# Patient Record
Sex: FEMALE | Race: OTHER | HISPANIC OR LATINO | URBAN - METROPOLITAN AREA
[De-identification: names, ages, dates, MRNs, and addresses within clinical notes are randomized per-mention and may not be internally consistent; named-entity substitution may affect disease eponyms.]

---

## 2017-05-08 ENCOUNTER — EMERGENCY (EMERGENCY)
Facility: HOSPITAL | Age: 32
LOS: 0 days | Discharge: HOME | End: 2017-05-08
Attending: EMERGENCY MEDICINE | Admitting: EMERGENCY MEDICINE
Payer: MEDICAID

## 2017-05-08 VITALS
SYSTOLIC BLOOD PRESSURE: 132 MMHG | OXYGEN SATURATION: 100 % | WEIGHT: 179.9 LBS | HEART RATE: 111 BPM | DIASTOLIC BLOOD PRESSURE: 80 MMHG | HEIGHT: 66 IN | TEMPERATURE: 98 F

## 2017-05-08 VITALS
TEMPERATURE: 98 F | OXYGEN SATURATION: 100 % | HEART RATE: 97 BPM | SYSTOLIC BLOOD PRESSURE: 124 MMHG | RESPIRATION RATE: 18 BRPM | DIASTOLIC BLOOD PRESSURE: 75 MMHG

## 2017-05-08 DIAGNOSIS — N39.0 URINARY TRACT INFECTION, SITE NOT SPECIFIED: ICD-10-CM

## 2017-05-08 DIAGNOSIS — N93.9 ABNORMAL UTERINE AND VAGINAL BLEEDING, UNSPECIFIED: ICD-10-CM

## 2017-05-08 DIAGNOSIS — M54.9 DORSALGIA, UNSPECIFIED: ICD-10-CM

## 2017-05-08 LAB
APPEARANCE UR: CLEAR — SIGNIFICANT CHANGE UP
BACTERIA # UR AUTO: (no result)
BILIRUB UR-MCNC: NEGATIVE — SIGNIFICANT CHANGE UP
COLOR SPEC: YELLOW — SIGNIFICANT CHANGE UP
DIFF PNL FLD: (no result)
EPI CELLS # UR: SIGNIFICANT CHANGE UP
GLUCOSE UR QL: NEGATIVE MG/DL — SIGNIFICANT CHANGE UP
KETONES UR-MCNC: NEGATIVE — SIGNIFICANT CHANGE UP
LEUKOCYTE ESTERASE UR-ACNC: (no result)
NITRITE UR-MCNC: NEGATIVE — SIGNIFICANT CHANGE UP
PH UR: 6 — SIGNIFICANT CHANGE UP (ref 5–8)
PROT UR-MCNC: 30 MG/DL
RBC CASTS # UR COMP ASSIST: (no result) /HPF (ref 0–4)
SP GR SPEC: 1.01 — SIGNIFICANT CHANGE UP (ref 1.01–1.02)
UROBILINOGEN FLD QL: NEGATIVE MG/DL — SIGNIFICANT CHANGE UP
WBC UR QL: >50

## 2017-05-08 PROCEDURE — 99283 EMERGENCY DEPT VISIT LOW MDM: CPT

## 2017-05-08 RX ORDER — IBUPROFEN 200 MG
600 TABLET ORAL ONCE
Qty: 0 | Refills: 0 | Status: COMPLETED | OUTPATIENT
Start: 2017-05-08 | End: 2017-05-08

## 2017-05-08 RX ORDER — PHENAZOPYRIDINE HCL 100 MG
1 TABLET ORAL
Qty: 6 | Refills: 0
Start: 2017-05-08 | End: 2017-05-10

## 2017-05-08 RX ORDER — CEPHALEXIN 500 MG
500 CAPSULE ORAL ONCE
Qty: 0 | Refills: 0 | Status: COMPLETED | OUTPATIENT
Start: 2017-05-08 | End: 2017-05-08

## 2017-05-08 RX ORDER — CEPHALEXIN 500 MG
1 CAPSULE ORAL
Qty: 21 | Refills: 0
Start: 2017-05-08 | End: 2017-05-15

## 2017-05-08 RX ORDER — PHENAZOPYRIDINE HCL 100 MG
100 TABLET ORAL ONCE
Qty: 0 | Refills: 0 | Status: COMPLETED | OUTPATIENT
Start: 2017-05-08 | End: 2017-05-08

## 2017-05-08 RX ADMIN — Medication 600 MILLIGRAM(S): at 04:03

## 2017-05-08 RX ADMIN — Medication 500 MILLIGRAM(S): at 04:03

## 2017-05-08 RX ADMIN — Medication 100 MILLIGRAM(S): at 04:03

## 2017-05-08 RX ADMIN — Medication 600 MILLIGRAM(S): at 03:13

## 2017-05-08 NOTE — ED PROVIDER NOTE - MEDICAL DECISION MAKING DETAILS
Patient states pain improved in back after motrin given.  Urine results discussed with patient who wants to trial oral antibiotics as outpatient and follow up with her doctor. Vitals are stable.  Patient understands to return for fever or any worsened symptoms.

## 2017-05-08 NOTE — ED PROVIDER NOTE - DETAILS:
I, Shruthi Roberson, performed the initial face to face bedside interview with this patient regarding history of present illness, review of symptoms and relevant past medical, social and family history.  I completed an independent physical examination.  I was the initial provider who evaluated this patient.  The history, relevant review of systems, past medical and surgical history, medical decision making, and physical examination was documented by the scribe in my presence and I attest to the accuracy of the documentation.

## 2017-05-08 NOTE — ED ADULT NURSE NOTE - OBJECTIVE STATEMENT
Patient arrived to ED c/o low abdominal pain radiating to back with burning. Hematuria since friday, burning sensation. Chills reported today. No significant medical hx.

## 2017-05-08 NOTE — ED ADULT TRIAGE NOTE - CHIEF COMPLAINT QUOTE
Pt. to the ED C/O Lower back pain with lower abdominal pain and light hematuria since friday with chills- Denies medical hx-  LMP 4/21/2017

## 2017-05-08 NOTE — ED PROVIDER NOTE - OBJECTIVE STATEMENT
32 y/o female presents to ED with c/o constant suprapubic/pelvic pain.  Has associated flank pain and worse with movement.  Has frequent urination and dysuria.  Noticed small amounts of blood in urine. Had chills. Denies n/v.

## 2017-05-08 NOTE — ED PROVIDER NOTE - NS ED MD SCRIBE ATTENDING SCRIBE SECTIONS
CONSULTATIONS/SHIFT CHANGE/RESULTS/DISPOSITION/HISTORY OF PRESENT ILLNESS/PAST MEDICAL/SURGICAL/SOCIAL HISTORY/PROGRESS NOTE/INTAKE ASSESSMENT/SCREENINGS/REVIEW OF SYSTEMS/HIV/PROVIDER CARE INITIATION/VITAL SIGNS( Pullset)/MANDATORY DOCUMENTATION/SCALES/OBSERVATION MONITORING PLAN/PHYSICAL EXAM

## 2017-11-08 ENCOUNTER — APPOINTMENT (OUTPATIENT)
Dept: ANTEPARTUM | Facility: CLINIC | Age: 32
End: 2017-11-08
Payer: MEDICAID

## 2017-11-08 ENCOUNTER — ASOB RESULT (OUTPATIENT)
Age: 32
End: 2017-11-08

## 2017-11-08 PROBLEM — Z00.00 ENCOUNTER FOR PREVENTIVE HEALTH EXAMINATION: Status: ACTIVE | Noted: 2017-11-08

## 2017-11-08 PROCEDURE — 76817 TRANSVAGINAL US OBSTETRIC: CPT

## 2017-11-08 PROCEDURE — 76811 OB US DETAILED SNGL FETUS: CPT

## 2017-11-22 ENCOUNTER — ASOB RESULT (OUTPATIENT)
Age: 32
End: 2017-11-22

## 2017-11-22 ENCOUNTER — APPOINTMENT (OUTPATIENT)
Dept: ANTEPARTUM | Facility: CLINIC | Age: 32
End: 2017-11-22
Payer: MEDICAID

## 2017-11-22 PROCEDURE — 76816 OB US FOLLOW-UP PER FETUS: CPT

## 2018-02-28 ENCOUNTER — APPOINTMENT (OUTPATIENT)
Dept: ANTEPARTUM | Facility: CLINIC | Age: 33
End: 2018-02-28
Payer: MEDICAID

## 2018-02-28 ENCOUNTER — ASOB RESULT (OUTPATIENT)
Age: 33
End: 2018-02-28

## 2018-02-28 PROCEDURE — 76816 OB US FOLLOW-UP PER FETUS: CPT

## 2018-02-28 PROCEDURE — 76819 FETAL BIOPHYS PROFIL W/O NST: CPT

## 2018-12-12 ENCOUNTER — EMERGENCY (EMERGENCY)
Facility: HOSPITAL | Age: 33
LOS: 0 days | Discharge: HOME | End: 2018-12-12
Attending: EMERGENCY MEDICINE | Admitting: EMERGENCY MEDICINE
Payer: MEDICAID

## 2018-12-12 VITALS
HEART RATE: 78 BPM | TEMPERATURE: 99 F | SYSTOLIC BLOOD PRESSURE: 141 MMHG | OXYGEN SATURATION: 100 % | RESPIRATION RATE: 16 BRPM | DIASTOLIC BLOOD PRESSURE: 92 MMHG

## 2018-12-12 VITALS — WEIGHT: 188.94 LBS | HEIGHT: 66 IN

## 2018-12-12 DIAGNOSIS — R10.9 UNSPECIFIED ABDOMINAL PAIN: ICD-10-CM

## 2018-12-12 DIAGNOSIS — R11.2 NAUSEA WITH VOMITING, UNSPECIFIED: ICD-10-CM

## 2018-12-12 LAB
ALBUMIN SERPL ELPH-MCNC: 4.2 G/DL — SIGNIFICANT CHANGE UP (ref 3.3–5)
ALP SERPL-CCNC: 96 U/L — SIGNIFICANT CHANGE UP (ref 40–120)
ALT FLD-CCNC: 26 U/L — SIGNIFICANT CHANGE UP (ref 12–78)
ANION GAP SERPL CALC-SCNC: 8 MMOL/L — SIGNIFICANT CHANGE UP (ref 5–17)
APPEARANCE UR: CLEAR — SIGNIFICANT CHANGE UP
APTT BLD: 33.6 SEC — SIGNIFICANT CHANGE UP (ref 27.5–36.3)
AST SERPL-CCNC: 13 U/L — LOW (ref 15–37)
BACTERIA # UR AUTO: ABNORMAL
BASOPHILS # BLD AUTO: 0.06 K/UL — SIGNIFICANT CHANGE UP (ref 0–0.2)
BASOPHILS NFR BLD AUTO: 0.4 % — SIGNIFICANT CHANGE UP (ref 0–2)
BILIRUB SERPL-MCNC: 0.3 MG/DL — SIGNIFICANT CHANGE UP (ref 0.2–1.2)
BILIRUB UR-MCNC: NEGATIVE — SIGNIFICANT CHANGE UP
BUN SERPL-MCNC: 11 MG/DL — SIGNIFICANT CHANGE UP (ref 7–23)
CALCIUM SERPL-MCNC: 8.5 MG/DL — SIGNIFICANT CHANGE UP (ref 8.5–10.1)
CHLORIDE SERPL-SCNC: 103 MMOL/L — SIGNIFICANT CHANGE UP (ref 96–108)
CO2 SERPL-SCNC: 25 MMOL/L — SIGNIFICANT CHANGE UP (ref 22–31)
COLOR SPEC: YELLOW — SIGNIFICANT CHANGE UP
CREAT SERPL-MCNC: 0.77 MG/DL — SIGNIFICANT CHANGE UP (ref 0.5–1.3)
DIFF PNL FLD: ABNORMAL
EOSINOPHIL # BLD AUTO: 0.06 K/UL — SIGNIFICANT CHANGE UP (ref 0–0.5)
EOSINOPHIL NFR BLD AUTO: 0.4 % — SIGNIFICANT CHANGE UP (ref 0–6)
EPI CELLS # UR: SIGNIFICANT CHANGE UP
GLUCOSE SERPL-MCNC: 110 MG/DL — HIGH (ref 70–99)
GLUCOSE UR QL: NEGATIVE MG/DL — SIGNIFICANT CHANGE UP
HCT VFR BLD CALC: 42.7 % — SIGNIFICANT CHANGE UP (ref 34.5–45)
HGB BLD-MCNC: 14.4 G/DL — SIGNIFICANT CHANGE UP (ref 11.5–15.5)
IMM GRANULOCYTES NFR BLD AUTO: 0.4 % — SIGNIFICANT CHANGE UP (ref 0–1.5)
INR BLD: 1.02 RATIO — SIGNIFICANT CHANGE UP (ref 0.88–1.16)
KETONES UR-MCNC: NEGATIVE — SIGNIFICANT CHANGE UP
LEUKOCYTE ESTERASE UR-ACNC: NEGATIVE — SIGNIFICANT CHANGE UP
LIDOCAIN IGE QN: 97 U/L — SIGNIFICANT CHANGE UP (ref 73–393)
LYMPHOCYTES # BLD AUTO: 14.5 % — SIGNIFICANT CHANGE UP (ref 13–44)
LYMPHOCYTES # BLD AUTO: 2.47 K/UL — SIGNIFICANT CHANGE UP (ref 1–3.3)
MCHC RBC-ENTMCNC: 29.8 PG — SIGNIFICANT CHANGE UP (ref 27–34)
MCHC RBC-ENTMCNC: 33.7 GM/DL — SIGNIFICANT CHANGE UP (ref 32–36)
MCV RBC AUTO: 88.4 FL — SIGNIFICANT CHANGE UP (ref 80–100)
MONOCYTES # BLD AUTO: 0.69 K/UL — SIGNIFICANT CHANGE UP (ref 0–0.9)
MONOCYTES NFR BLD AUTO: 4.1 % — SIGNIFICANT CHANGE UP (ref 2–14)
NEUTROPHILS # BLD AUTO: 13.63 K/UL — HIGH (ref 1.8–7.4)
NEUTROPHILS NFR BLD AUTO: 80.2 % — HIGH (ref 43–77)
NITRITE UR-MCNC: NEGATIVE — SIGNIFICANT CHANGE UP
NRBC # BLD: 0 /100 WBCS — SIGNIFICANT CHANGE UP (ref 0–0)
PH UR: 6.5 — SIGNIFICANT CHANGE UP (ref 5–8)
PLATELET # BLD AUTO: 358 K/UL — SIGNIFICANT CHANGE UP (ref 150–400)
POTASSIUM SERPL-MCNC: 4.3 MMOL/L — SIGNIFICANT CHANGE UP (ref 3.5–5.3)
POTASSIUM SERPL-SCNC: 4.3 MMOL/L — SIGNIFICANT CHANGE UP (ref 3.5–5.3)
PROT SERPL-MCNC: 8.6 GM/DL — HIGH (ref 6–8.3)
PROT UR-MCNC: NEGATIVE MG/DL — SIGNIFICANT CHANGE UP
PROTHROM AB SERPL-ACNC: 11.3 SEC — SIGNIFICANT CHANGE UP (ref 10–12.9)
RBC # BLD: 4.83 M/UL — SIGNIFICANT CHANGE UP (ref 3.8–5.2)
RBC # FLD: 12.1 % — SIGNIFICANT CHANGE UP (ref 10.3–14.5)
RBC CASTS # UR COMP ASSIST: ABNORMAL /HPF (ref 0–4)
SODIUM SERPL-SCNC: 136 MMOL/L — SIGNIFICANT CHANGE UP (ref 135–145)
SP GR SPEC: 1.01 — SIGNIFICANT CHANGE UP (ref 1.01–1.02)
UROBILINOGEN FLD QL: NEGATIVE MG/DL — SIGNIFICANT CHANGE UP
WBC # BLD: 16.98 K/UL — HIGH (ref 3.8–10.5)
WBC # FLD AUTO: 16.98 K/UL — HIGH (ref 3.8–10.5)
WBC UR QL: SIGNIFICANT CHANGE UP

## 2018-12-12 PROCEDURE — 99284 EMERGENCY DEPT VISIT MOD MDM: CPT | Mod: 25

## 2018-12-12 RX ORDER — MORPHINE SULFATE 50 MG/1
4 CAPSULE, EXTENDED RELEASE ORAL ONCE
Qty: 0 | Refills: 0 | Status: DISCONTINUED | OUTPATIENT
Start: 2018-12-12 | End: 2018-12-12

## 2018-12-12 RX ORDER — PANTOPRAZOLE SODIUM 20 MG/1
40 TABLET, DELAYED RELEASE ORAL ONCE
Qty: 0 | Refills: 0 | Status: COMPLETED | OUTPATIENT
Start: 2018-12-12 | End: 2018-12-12

## 2018-12-12 RX ORDER — ONDANSETRON 8 MG/1
4 TABLET, FILM COATED ORAL ONCE
Qty: 0 | Refills: 0 | Status: COMPLETED | OUTPATIENT
Start: 2018-12-12 | End: 2018-12-12

## 2018-12-12 RX ORDER — SODIUM CHLORIDE 9 MG/ML
1000 INJECTION INTRAMUSCULAR; INTRAVENOUS; SUBCUTANEOUS ONCE
Qty: 0 | Refills: 0 | Status: COMPLETED | OUTPATIENT
Start: 2018-12-12 | End: 2018-12-12

## 2018-12-12 RX ADMIN — ONDANSETRON 4 MILLIGRAM(S): 8 TABLET, FILM COATED ORAL at 19:28

## 2018-12-12 RX ADMIN — SODIUM CHLORIDE 1000 MILLILITER(S): 9 INJECTION INTRAMUSCULAR; INTRAVENOUS; SUBCUTANEOUS at 20:31

## 2018-12-12 RX ADMIN — MORPHINE SULFATE 4 MILLIGRAM(S): 50 CAPSULE, EXTENDED RELEASE ORAL at 19:28

## 2018-12-12 RX ADMIN — MORPHINE SULFATE 4 MILLIGRAM(S): 50 CAPSULE, EXTENDED RELEASE ORAL at 19:46

## 2018-12-12 RX ADMIN — SODIUM CHLORIDE 1000 MILLILITER(S): 9 INJECTION INTRAMUSCULAR; INTRAVENOUS; SUBCUTANEOUS at 19:26

## 2018-12-12 RX ADMIN — PANTOPRAZOLE SODIUM 40 MILLIGRAM(S): 20 TABLET, DELAYED RELEASE ORAL at 19:28

## 2018-12-12 NOTE — ED STATDOCS - GASTROINTESTINAL, MLM
abdomen soft. +tenderness throughout lower abdomen, no rebound, no guarding. Mild tenderness midepigastric.

## 2018-12-12 NOTE — ED ADULT NURSE NOTE - OBJECTIVE STATEMENT
pt arrives to ED complaining of abdominal pain. pt states the pain started this morning. nontender on palpation. +bowel sounds. pt has associated nausea and vomiting. no significant medical history. pt currently breast feeding eight month old infant. alert and oriented x 4.

## 2018-12-12 NOTE — ED STATDOCS - MEDICAL DECISION MAKING DETAILS
Pt with abd pain, bilateral lower abd, recent viral illness at home, likely same. Plan for labs, fluids, Protonix, Zofran, Morphine, and reassess. If concerns on labs, will perform CT. Advised pt to pump and discard breast milk.

## 2018-12-12 NOTE — ED ADULT NURSE NOTE - NSIMPLEMENTINTERV_GEN_ALL_ED
Implemented All Universal Safety Interventions:  [place] to call system. Call bell, personal items and telephone within reach. Instruct patient to call for assistance. Room bathroom lighting operational. Non-slip footwear when patient is off stretcher. Physically safe environment: no spills, clutter or unnecessary equipment. Stretcher in lowest position, wheels locked, appropriate side rails in place.

## 2018-12-12 NOTE — ED STATDOCS - ATTENDING CONTRIBUTION TO CARE
I, Audi Waters DO,  performed the initial face to face bedside interview with this patient regarding history of present illness, review of symptoms and relevant past medical, social and family history.  I completed an independent physical examination.  I was the initial provider who evaluated this patient. I have signed out the follow up of any pending tests (i.e. labs, radiological studies) to the ACP.  I have communicated the patient’s plan of care and disposition with the ACP.

## 2018-12-12 NOTE — ED STATDOCS - NS_ ATTENDINGSCRIBEDETAILS _ED_A_ED_FT
Audi Waters DO (Attending): The history, relevant review of systems, past medical and surgical history, medical decision making, and physical examination was documented by the scribe in my presence and I attest to the accuracy of the documentation.

## 2018-12-12 NOTE — ED STATDOCS - OBJECTIVE STATEMENT
34 y/o female with no relevant PMHx presents to the ED c/o mid epigastric pain down to the rest of her abd since yesterday. +mild nausea yesterday, none at time of evaluation. +sick contact with daughter who was vomiting last week. No vomiting, diarrhea, fever, chills, dysuria. No recent travel. Pt has 8 month old at her, is breast feeding, LMP in June 2017. Non smoker. No EtOH use. No illicit drug use.

## 2020-02-08 NOTE — ED PROVIDER NOTE - DURATION
Hospital Medicine Discharge Summary    Patient ID: Shan Vaz      Patient's PCP: CHRYSTAL Zafar    Admit Date: 2/6/2020     Discharge Date:   02/08/20    Admitting Physician: Dulce Maria Gallegos MD     Discharge Physician: Dulce Maria Gallegos MD     Discharge Diagnoses: Active Hospital Problems    Diagnosis    GI bleed [K92.2]       The patient was seen and examined on day of discharge and this discharge summary is in conjunction with any daily progress note from day of discharge. Hospital Course:   68 y. o. female who presented to [facility] with weakness. For the past week, patient has been having increased weakness and some mild mental fogginess. Patient is vague about specifics of the these symptoms. Patient is also complaining of alternating constipation and diarrhea. She does report darker than usual stools but no tarry stools. She has never had symptoms like this before.     Acute blood loss anemia due to GI bleed  - occult stool positive  - Hgb 8.3 on admission. Had been normal in 10/19  - reports NSAID use. Advised to discontinue on discharge  - GI consulted  - possible duodenitis seen on CT abd/pel  - RUQ US without evidence of cholecystitis  - s/p EGD with large necrotic duodenal ulcer. Biopsies pending  - transitioned to PO PPI BID  - to follow up with GI as outpatient for repeat EGD     UTI with associated staghorn calculus  - staghorn calculus noted on CT abd/pel  - UA consistent with UTI  - started on ceftriaxone  - sepsis ruled out. Tachycardia and mild Hgb elevation related to GI bleed     DMII  - well controlled  - restarted home regimen     HTN  - restarted home BP meds    HLD  - continue statin    Physical Exam Performed:     /75   Pulse 65   Temp 98.2 °F (36.8 °C) (Oral)   Resp 16   Ht 5' 4\" (1.626 m)   Wt 204 lb (92.5 kg)   SpO2 97%   BMI 35.02 kg/m²       General appearance:  No apparent distress, appears stated age and cooperative.   HEENT:  Normal cephalic, adjacent to the   proximal duodenum and gallbladder. Localized inflammation related to   duodenitis or potentially cholecystitis can not be excluded. No evidence of appendicitis. Popcorn calcification along the left lateral margin of uterus likely reflects   either pedunculated fibroid or partially calcified left adnexal lesion. In   the nonacute setting, pelvic ultrasound could be performed for clarification. Focal sclerosis within L1 vertebral body, typically bone island in an   otherwise healthy patient. If patient has risk factors for metastatic   disease, bone scan could be considered for further assessment. XR CHEST STANDARD (2 VW)   Final Result   No acute cardiopulmonary process. Consults:     IP CONSULT TO HOSPITALIST  IP CONSULT TO GI  IP CONSULT TO HOME CARE NEEDS    Disposition:  Home with home health     Condition at Discharge: Stable    Discharge Instructions/Follow-up:  Follow up with PCP, GI within 1-2 weeks    Code Status:  Full Code     Activity: activity as tolerated    Diet: diabetic diet      Discharge Medications:     Current Discharge Medication List           Details   ferrous sulfate 325 (65 Fe) MG tablet Take 1 tablet by mouth daily (with breakfast)  Qty: 30 tablet, Refills: 3      polyethylene glycol (GLYCOLAX) packet Take 17 g by mouth daily  Qty: 527 g, Refills: 1      cefdinir (OMNICEF) 300 MG capsule Take 1 capsule by mouth 2 times daily for 5 days  Qty: 10 capsule, Refills: 0      pantoprazole (PROTONIX) 40 MG tablet Take 1 tablet by mouth 2 times daily (before meals)  Qty: 60 tablet, Refills: 1              Details   glipiZIDE (GLUCOTROL) 10 MG tablet TAKE 1 TABLET BY MOUTH EVERY DAY  Qty: 90 tablet, Refills: 0      losartan (COZAAR) 100 MG tablet TAKE 1 TABLET BY MOUTH EVERY DAY  Qty: 90 tablet, Refills: 0      amLODIPine (NORVASC) 2.5 MG tablet Take 1 tablet by mouth daily  Qty: 90 tablet, Refills: 0      !!  Handicap Placard MISC Expiration 1/2025  Qty: 1 each, Refills: 0      oxybutynin (DITROPAN) 5 MG tablet Take 1 tablet by mouth 2 times daily  Qty: 60 tablet, Refills: 3      !! Handicap Placard MISC by Does not apply route  Qty: 1 each, Refills: 0    Associated Diagnoses: Chronic pain of both knees; Type 2 diabetes mellitus without complication, without long-term current use of insulin (HCC)      metoprolol (LOPRESSOR) 100 MG tablet Take 100 mg by mouth       atorvastatin (LIPITOR) 40 MG tablet Take 1 tablet by mouth daily  Qty: 90 tablet, Refills: 1      Cholecalciferol 100 MCG (4000 UT) CAPS Take 1 capsule by mouth daily  Qty: 90 capsule, Refills: 1       !! - Potential duplicate medications found. Please discuss with provider. Time Spent on discharge is more than 30 minutes in the examination, evaluation, counseling and review of medications and discharge plan. Signed:    Chasity Gallardo MD   2/8/2020      Thank you CHRYSTAL Plummer for the opportunity to be involved in this patient's care. If you have any questions or concerns please feel free to contact me at [phone]. day(s)

## 2020-03-23 ENCOUNTER — INPATIENT (INPATIENT)
Facility: HOSPITAL | Age: 35
LOS: 2 days | Discharge: HOME | DRG: 253 | End: 2020-03-26
Attending: HOSPITALIST | Admitting: HOSPITALIST
Payer: MEDICAID

## 2020-03-23 VITALS — HEIGHT: 65 IN | WEIGHT: 149.91 LBS

## 2020-03-23 LAB
ALBUMIN SERPL ELPH-MCNC: 3.6 G/DL — SIGNIFICANT CHANGE UP (ref 3.3–5)
ALP SERPL-CCNC: 80 U/L — SIGNIFICANT CHANGE UP (ref 40–120)
ALT FLD-CCNC: 20 U/L — SIGNIFICANT CHANGE UP (ref 12–78)
ANION GAP SERPL CALC-SCNC: 5 MMOL/L — SIGNIFICANT CHANGE UP (ref 5–17)
APPEARANCE UR: CLEAR — SIGNIFICANT CHANGE UP
AST SERPL-CCNC: 15 U/L — SIGNIFICANT CHANGE UP (ref 15–37)
BASOPHILS # BLD AUTO: 0.04 K/UL — SIGNIFICANT CHANGE UP (ref 0–0.2)
BASOPHILS NFR BLD AUTO: 0.4 % — SIGNIFICANT CHANGE UP (ref 0–2)
BILIRUB SERPL-MCNC: 0.3 MG/DL — SIGNIFICANT CHANGE UP (ref 0.2–1.2)
BILIRUB UR-MCNC: NEGATIVE — SIGNIFICANT CHANGE UP
BUN SERPL-MCNC: 22 MG/DL — SIGNIFICANT CHANGE UP (ref 7–23)
CALCIUM SERPL-MCNC: 8.3 MG/DL — LOW (ref 8.5–10.1)
CHLORIDE SERPL-SCNC: 105 MMOL/L — SIGNIFICANT CHANGE UP (ref 96–108)
CO2 SERPL-SCNC: 28 MMOL/L — SIGNIFICANT CHANGE UP (ref 22–31)
COLOR SPEC: YELLOW — SIGNIFICANT CHANGE UP
CREAT SERPL-MCNC: 0.84 MG/DL — SIGNIFICANT CHANGE UP (ref 0.5–1.3)
DIFF PNL FLD: ABNORMAL
EOSINOPHIL # BLD AUTO: 0.14 K/UL — SIGNIFICANT CHANGE UP (ref 0–0.5)
EOSINOPHIL NFR BLD AUTO: 1.3 % — SIGNIFICANT CHANGE UP (ref 0–6)
GLUCOSE SERPL-MCNC: 103 MG/DL — HIGH (ref 70–99)
GLUCOSE UR QL: NEGATIVE MG/DL — SIGNIFICANT CHANGE UP
HCT VFR BLD CALC: 35.2 % — SIGNIFICANT CHANGE UP (ref 34.5–45)
HGB BLD-MCNC: 12 G/DL — SIGNIFICANT CHANGE UP (ref 11.5–15.5)
IMM GRANULOCYTES NFR BLD AUTO: 0.3 % — SIGNIFICANT CHANGE UP (ref 0–1.5)
KETONES UR-MCNC: NEGATIVE — SIGNIFICANT CHANGE UP
LEUKOCYTE ESTERASE UR-ACNC: NEGATIVE — SIGNIFICANT CHANGE UP
LIDOCAIN IGE QN: 108 U/L — SIGNIFICANT CHANGE UP (ref 73–393)
LYMPHOCYTES # BLD AUTO: 3.91 K/UL — HIGH (ref 1–3.3)
LYMPHOCYTES # BLD AUTO: 37.5 % — SIGNIFICANT CHANGE UP (ref 13–44)
MCHC RBC-ENTMCNC: 29.6 PG — SIGNIFICANT CHANGE UP (ref 27–34)
MCHC RBC-ENTMCNC: 34.1 GM/DL — SIGNIFICANT CHANGE UP (ref 32–36)
MCV RBC AUTO: 86.9 FL — SIGNIFICANT CHANGE UP (ref 80–100)
MONOCYTES # BLD AUTO: 0.52 K/UL — SIGNIFICANT CHANGE UP (ref 0–0.9)
MONOCYTES NFR BLD AUTO: 5 % — SIGNIFICANT CHANGE UP (ref 2–14)
NEUTROPHILS # BLD AUTO: 5.78 K/UL — SIGNIFICANT CHANGE UP (ref 1.8–7.4)
NEUTROPHILS NFR BLD AUTO: 55.5 % — SIGNIFICANT CHANGE UP (ref 43–77)
NITRITE UR-MCNC: NEGATIVE — SIGNIFICANT CHANGE UP
PH UR: 5 — SIGNIFICANT CHANGE UP (ref 5–8)
PLATELET # BLD AUTO: 382 K/UL — SIGNIFICANT CHANGE UP (ref 150–400)
POTASSIUM SERPL-MCNC: 4 MMOL/L — SIGNIFICANT CHANGE UP (ref 3.5–5.3)
POTASSIUM SERPL-SCNC: 4 MMOL/L — SIGNIFICANT CHANGE UP (ref 3.5–5.3)
PROT SERPL-MCNC: 7.6 GM/DL — SIGNIFICANT CHANGE UP (ref 6–8.3)
PROT UR-MCNC: NEGATIVE MG/DL — SIGNIFICANT CHANGE UP
RBC # BLD: 4.05 M/UL — SIGNIFICANT CHANGE UP (ref 3.8–5.2)
RBC # FLD: 12.3 % — SIGNIFICANT CHANGE UP (ref 10.3–14.5)
SODIUM SERPL-SCNC: 138 MMOL/L — SIGNIFICANT CHANGE UP (ref 135–145)
SP GR SPEC: 1.02 — SIGNIFICANT CHANGE UP (ref 1.01–1.02)
UROBILINOGEN FLD QL: NEGATIVE MG/DL — SIGNIFICANT CHANGE UP
WBC # BLD: 10.42 K/UL — SIGNIFICANT CHANGE UP (ref 3.8–10.5)
WBC # FLD AUTO: 10.42 K/UL — SIGNIFICANT CHANGE UP (ref 3.8–10.5)

## 2020-03-23 PROCEDURE — 93010 ELECTROCARDIOGRAM REPORT: CPT

## 2020-03-23 PROCEDURE — 74174 CTA ABD&PLVS W/CONTRAST: CPT | Mod: 26

## 2020-03-23 RX ORDER — SODIUM CHLORIDE 9 MG/ML
1000 INJECTION INTRAMUSCULAR; INTRAVENOUS; SUBCUTANEOUS ONCE
Refills: 0 | Status: COMPLETED | OUTPATIENT
Start: 2020-03-23 | End: 2020-03-23

## 2020-03-23 RX ADMIN — SODIUM CHLORIDE 2000 MILLILITER(S): 9 INJECTION INTRAMUSCULAR; INTRAVENOUS; SUBCUTANEOUS at 21:22

## 2020-03-23 NOTE — ED ADULT NURSE NOTE - OBJECTIVE STATEMENT
Patient presents with four episodes of bright red blood starting today. Patient denies lightheaded dizziness or shortness of breath

## 2020-03-23 NOTE — ED ADULT NURSE REASSESSMENT NOTE - NS ED NURSE REASSESS COMMENT FT1
Received report from [facility].  1st unit of PRBCs infusing. Vital signs stable.  Awaiting CT results. Will continue to monitor.

## 2020-03-23 NOTE — ED STATDOCS - ATTENDING CONTRIBUTION TO CARE
I, Antonio Costa, performed the initial face to face bedside interview with this patient regarding history of present illness, review of symptoms and relevant past medical, social and family history.  I completed an independent physical examination.  I was the initial provider who evaluated this patient. I have signed out the follow up of any pending tests (i.e. labs, radiological studies) to the ACP.  I have communicated the patient’s plan of care and disposition with the ACP.  The history, relevant review of systems, past medical and surgical history, medical decision making, and physical examination was documented by the scribe in my presence and I attest to the accuracy of the documentation.

## 2020-03-23 NOTE — ED STATDOCS - OBJECTIVE STATEMENT
33 y/o female with no PMHx presents c/o 4 BM that were blood streaked with bright red blood per rectum since 4pm today. Pt also with diffuse cramping abd pain. Pt reports straining x few days with BMs last week. denies any external hemorrhoids or lumps. no medications, ASA, NSAIDs. denies cough, fever, travel,. sick contacts. PI# [id number].

## 2020-03-23 NOTE — ED STATDOCS - PROGRESS NOTE DETAILS
35 y/o female with no PMHx presents c/o 4 BM that were blood streaked with bright red blood per rectum since 4pm today. Pt also with diffuse cramping abd pain. Pt reports straining x few days with BMs last week. denies any external hemorrhoids or lumps. no medications, ASA, NSAIDs. denies cough, fever, travel,. sick contacts. PI# [id number]. Abd: soft (+) diffuse tenderness to abdomen plan: ct, labs, and reeval. -Rebeca Hines PA-C pt transferred to [facility] for higher acuity of care, she went to bathroom had BRBPR and near syncopal episode. MD at bedside.  -Rebeca Hines PA-C

## 2020-03-23 NOTE — ED ADULT TRIAGE NOTE - CHIEF COMPLAINT QUOTE
pt p/w c/o BRBPR x 4 today.  pt states she is asymptomatic at this time denies HA, dizziness, sob, palpitations, fever, cough.  no sick contacts or recent travel.

## 2020-03-24 DIAGNOSIS — K92.2 GASTROINTESTINAL HEMORRHAGE, UNSPECIFIED: ICD-10-CM

## 2020-03-24 LAB
BASOPHILS # BLD AUTO: 0.02 K/UL — SIGNIFICANT CHANGE UP (ref 0–0.2)
BASOPHILS NFR BLD AUTO: 0.2 % — SIGNIFICANT CHANGE UP (ref 0–2)
CULTURE RESULTS: SIGNIFICANT CHANGE UP
EOSINOPHIL # BLD AUTO: 0.01 K/UL — SIGNIFICANT CHANGE UP (ref 0–0.5)
EOSINOPHIL NFR BLD AUTO: 0.1 % — SIGNIFICANT CHANGE UP (ref 0–6)
HCT VFR BLD CALC: 30.3 % — LOW (ref 34.5–45)
HCT VFR BLD CALC: 33.3 % — LOW (ref 34.5–45)
HCT VFR BLD CALC: 33.4 % — LOW (ref 34.5–45)
HGB BLD-MCNC: 10.2 G/DL — LOW (ref 11.5–15.5)
HGB BLD-MCNC: 11.3 G/DL — LOW (ref 11.5–15.5)
HGB BLD-MCNC: 11.5 G/DL — SIGNIFICANT CHANGE UP (ref 11.5–15.5)
IMM GRANULOCYTES NFR BLD AUTO: 0.4 % — SIGNIFICANT CHANGE UP (ref 0–1.5)
LYMPHOCYTES # BLD AUTO: 1.9 K/UL — SIGNIFICANT CHANGE UP (ref 1–3.3)
LYMPHOCYTES # BLD AUTO: 14.9 % — SIGNIFICANT CHANGE UP (ref 13–44)
MCHC RBC-ENTMCNC: 28.8 PG — SIGNIFICANT CHANGE UP (ref 27–34)
MCHC RBC-ENTMCNC: 29.3 PG — SIGNIFICANT CHANGE UP (ref 27–34)
MCHC RBC-ENTMCNC: 33.7 GM/DL — SIGNIFICANT CHANGE UP (ref 32–36)
MCHC RBC-ENTMCNC: 33.8 GM/DL — SIGNIFICANT CHANGE UP (ref 32–36)
MCV RBC AUTO: 85.6 FL — SIGNIFICANT CHANGE UP (ref 80–100)
MCV RBC AUTO: 86.5 FL — SIGNIFICANT CHANGE UP (ref 80–100)
MONOCYTES # BLD AUTO: 0.43 K/UL — SIGNIFICANT CHANGE UP (ref 0–0.9)
MONOCYTES NFR BLD AUTO: 3.4 % — SIGNIFICANT CHANGE UP (ref 2–14)
NEUTROPHILS # BLD AUTO: 10.31 K/UL — HIGH (ref 1.8–7.4)
NEUTROPHILS NFR BLD AUTO: 81 % — HIGH (ref 43–77)
PLATELET # BLD AUTO: 253 K/UL — SIGNIFICANT CHANGE UP (ref 150–400)
PLATELET # BLD AUTO: 261 K/UL — SIGNIFICANT CHANGE UP (ref 150–400)
RBC # BLD: 3.54 M/UL — LOW (ref 3.8–5.2)
RBC # BLD: 3.86 M/UL — SIGNIFICANT CHANGE UP (ref 3.8–5.2)
RBC # FLD: 13.2 % — SIGNIFICANT CHANGE UP (ref 10.3–14.5)
RBC # FLD: 13.2 % — SIGNIFICANT CHANGE UP (ref 10.3–14.5)
SPECIMEN SOURCE: SIGNIFICANT CHANGE UP
WBC # BLD: 12.72 K/UL — HIGH (ref 3.8–10.5)
WBC # BLD: 8.99 K/UL — SIGNIFICANT CHANGE UP (ref 3.8–10.5)
WBC # FLD AUTO: 12.72 K/UL — HIGH (ref 3.8–10.5)
WBC # FLD AUTO: 8.99 K/UL — SIGNIFICANT CHANGE UP (ref 3.8–10.5)

## 2020-03-24 PROCEDURE — 36415 COLL VENOUS BLD VENIPUNCTURE: CPT

## 2020-03-24 PROCEDURE — 80048 BASIC METABOLIC PNL TOTAL CA: CPT

## 2020-03-24 PROCEDURE — 90686 IIV4 VACC NO PRSV 0.5 ML IM: CPT

## 2020-03-24 PROCEDURE — 85014 HEMATOCRIT: CPT

## 2020-03-24 PROCEDURE — 85027 COMPLETE CBC AUTOMATED: CPT

## 2020-03-24 PROCEDURE — 99223 1ST HOSP IP/OBS HIGH 75: CPT

## 2020-03-24 PROCEDURE — 85018 HEMOGLOBIN: CPT

## 2020-03-24 PROCEDURE — 85025 COMPLETE CBC W/AUTO DIFF WBC: CPT

## 2020-03-24 PROCEDURE — C9113: CPT

## 2020-03-24 PROCEDURE — 12345: CPT | Mod: NC

## 2020-03-24 PROCEDURE — G0008: CPT

## 2020-03-24 RX ORDER — PANTOPRAZOLE SODIUM 20 MG/1
40 TABLET, DELAYED RELEASE ORAL EVERY 12 HOURS
Refills: 0 | Status: DISCONTINUED | OUTPATIENT
Start: 2020-03-24 | End: 2020-03-24

## 2020-03-24 RX ORDER — ONDANSETRON 8 MG/1
4 TABLET, FILM COATED ORAL EVERY 6 HOURS
Refills: 0 | Status: DISCONTINUED | OUTPATIENT
Start: 2020-03-24 | End: 2020-03-26

## 2020-03-24 RX ORDER — SODIUM CHLORIDE 9 MG/ML
1000 INJECTION INTRAMUSCULAR; INTRAVENOUS; SUBCUTANEOUS
Refills: 0 | Status: DISCONTINUED | OUTPATIENT
Start: 2020-03-24 | End: 2020-03-26

## 2020-03-24 RX ORDER — INFLUENZA VIRUS VACCINE 15; 15; 15; 15 UG/.5ML; UG/.5ML; UG/.5ML; UG/.5ML
0.5 SUSPENSION INTRAMUSCULAR ONCE
Refills: 0 | Status: COMPLETED | OUTPATIENT
Start: 2020-03-24 | End: 2020-03-26

## 2020-03-24 RX ORDER — PANTOPRAZOLE SODIUM 20 MG/1
40 TABLET, DELAYED RELEASE ORAL
Refills: 0 | Status: DISCONTINUED | OUTPATIENT
Start: 2020-03-24 | End: 2020-03-26

## 2020-03-24 RX ORDER — MORPHINE SULFATE 50 MG/1
2 CAPSULE, EXTENDED RELEASE ORAL ONCE
Refills: 0 | Status: DISCONTINUED | OUTPATIENT
Start: 2020-03-24 | End: 2020-03-24

## 2020-03-24 RX ADMIN — MORPHINE SULFATE 2 MILLIGRAM(S): 50 CAPSULE, EXTENDED RELEASE ORAL at 04:38

## 2020-03-24 RX ADMIN — PANTOPRAZOLE SODIUM 40 MILLIGRAM(S): 20 TABLET, DELAYED RELEASE ORAL at 06:11

## 2020-03-24 RX ADMIN — MORPHINE SULFATE 2 MILLIGRAM(S): 50 CAPSULE, EXTENDED RELEASE ORAL at 04:23

## 2020-03-24 RX ADMIN — SODIUM CHLORIDE 125 MILLILITER(S): 9 INJECTION INTRAMUSCULAR; INTRAVENOUS; SUBCUTANEOUS at 04:23

## 2020-03-24 RX ADMIN — SODIUM CHLORIDE 125 MILLILITER(S): 9 INJECTION INTRAMUSCULAR; INTRAVENOUS; SUBCUTANEOUS at 13:53

## 2020-03-24 NOTE — H&P ADULT - HISTORY OF PRESENT ILLNESS
34 year old female patient presented to the ED after 4 episodes of bright red blood per rectum during bowel movements that were associated with mild abdominal cramping. Patient Denies any nausea, vomiting, dizziness, presyncope, recent NSAID or alcohol use.       In the ED patient was found to have a H&H of 12/35. Patient had a brief syncopal episode while attempting to have another bowel movement, which was noted for blood. A CTA abdomen/pelvis was negative for an acute hemorrhage. Patient transfused 2 units PRBC

## 2020-03-24 NOTE — ED PROVIDER NOTE - NEUROLOGICAL, MLM
Alert and oriented, no focal deficits, no motor or sensory deficits. CNs 2-12 grossly intact. NIH=0 GCS=15

## 2020-03-24 NOTE — ED PROVIDER NOTE - CONSTITUTIONAL, MLM
normal... Well appearing, awake, alert, oriented to person, place, time/situation and in no apparent distress. Nontoxic appearing.

## 2020-03-24 NOTE — H&P ADULT - ASSESSMENT
34 year old female patient with Gastrointestinal bleed and brief vagovagal syncopal episode      -Admit to [facility]      #GI bleed  -currently NPO  -serial abdominal examination  -get 2 large bore IVs  -protonix IV  -type and cross on file  -patient to be transfused 2 units PRBC  -Gastroenterology to evaluate pt      # Vasovagal syncope  -pt with brief syncopal episode after straining/ while having bowel movement  -[place] syncope rule currently pt patient in a low risk group for serious outcome  -no indication for telemetry monitoring or cardiology evaluation    #DVT ppx  -encourage ambulation

## 2020-03-24 NOTE — PATIENT PROFILE ADULT - HAS THE PATIENT EXPERIENCED ANY OF THE FOLLOWING WITHIN THE WEEK PRIOR TO ADMISSION?
Called to discuss ultrasound result a cervical length was completely normal. She can call back to discuss or follow-up routine. Left message   no

## 2020-03-24 NOTE — H&P ADULT - NSHPPHYSICALEXAM_GEN_ALL_CORE
Vital Signs Last 24 Hrs  T(C): 36.9 (24 Mar 2020 03:42), Max: 37.2 (24 Mar 2020 00:02)  T(F): 98.5 (24 Mar 2020 03:42), Max: 99 (24 Mar 2020 00:02)  HR: 88 (24 Mar 2020 03:42) (88 - 100)  BP: 110/62 (24 Mar 2020 03:42) (97/82 - 161/81)  BP(mean): 89 (23 Mar 2020 22:12) (89 - 98)  RR: 16 (24 Mar 2020 03:42) (16 - 19)  SpO2: 100% (24 Mar 2020 03:42) (100% - 100%)

## 2020-03-24 NOTE — ED PROVIDER NOTE - OBJECTIVE STATEMENT
34 [facility], near syncope. No cp, sob or palpitation. No fever or chills. No melena but has had blood in stool.

## 2020-03-24 NOTE — CONSULT NOTE ADULT - SUBJECTIVE AND OBJECTIVE BOX
GI consult    HPI:  34 year old female patient presented to the ED after 4 episodes of bright red blood per rectum during bowel movements that were associated with mild abdominal cramping. Patient Denies any nausea, vomiting, dizziness, presyncope, recent NSAID or alcohol use.   In the ED patient was found to have a H&H of 12/35. Patient had a brief syncopal episode while attempting to have another bowel movement, which was noted for blood. A CTA abdomen/pelvis was negative for an acute hemorrhage. Patient transfused 2 units PRBC.  Today is well with mild lingering lower abd pain, no futher bleeding.      PAST MEDICAL & SURGICAL HISTORY:  No significant past medical history  No significant past surgical history      Home Medications:  folic acid:  orally  (08 May 2017 03:12)  multivitamin:    (08 May 2017 03:12)      MEDICATIONS  (STANDING):  influenza   Vaccine 0.5 milliLiter(s) IntraMuscular once  pantoprazole  Injectable 40 milliGRAM(s) IV Push every 12 hours  sodium chloride 0.9%. 1000 milliLiter(s) (125 mL/Hr) IV Continuous <Continuous>    MEDICATIONS  (PRN):  ondansetron Injectable 4 milliGRAM(s) IV Push every 6 hours PRN Nausea      Allergies    No Known Allergies    Intolerances        SOCIAL HISTORY: NC    FAMILY HISTORY:  FH: HTN (hypertension)  Family history of diabetes mellitus (DM)      ROS  As above  Otherwise unremarkable, all systems reviewed    PE:  Vital Signs Last 24 Hrs  T(C): 36.9 (24 Mar 2020 10:47), Max: 37.2 (24 Mar 2020 00:02)  T(F): 98.5 (24 Mar 2020 10:47), Max: 99 (24 Mar 2020 00:02)  HR: 84 (24 Mar 2020 10:47) (84 - 100)  BP: 104/63 (24 Mar 2020 10:47) (97/82 - 161/81)  BP(mean): 89 (23 Mar 2020 22:12) (89 - 98)  RR: 16 (24 Mar 2020 10:47) (16 - 19)  SpO2: 100% (24 Mar 2020 10:47) (100% - 100%)    Constitutional: NAD, well-developed, A+Ox3  Anicteric   Respiratory: CTABL, breathing comfortably  Cardiovascular: S1 and S2, RRR  Gastrointestinal: +BS, soft, mild LLQ and RLQ tenderness, non distended, no mass  Extremities: warm, well perfused, no edema  Psychiatric: Normal mood, normal affect  Neuro: moves all extremities, grossly intact  Skin: No rashes or lesions    LABS:                        11.3   12.72 )-----------( 261      ( 24 Mar 2020 06:27 )             33.4     03-23    138  |  105  |  22  ----------------------------<  103<H>  4.0   |  28  |  0.84    Ca    8.3<L>      23 Mar 2020 20:46    TPro  7.6  /  Alb  3.6  /  TBili  0.3  /  DBili  x   /  AST  15  /  ALT  20  /  AlkPhos  80  03-23      LIVER FUNCTIONS - ( 23 Mar 2020 20:46 )  Alb: 3.6 g/dL / Pro: 7.6 gm/dL / ALK PHOS: 80 U/L / ALT: 20 U/L / AST: 15 U/L / GGT: x             RADIOLOGY & ADDITIONAL STUDIES:  CTA abd/pel negative

## 2020-03-24 NOTE — ED PROVIDER NOTE - EYES, MLM
Clear bilaterally, pupils equal, round and reactive to light. EOMI. Visual fields intact x 4 quadrants.

## 2020-03-24 NOTE — ED PROVIDER NOTE - CLINICAL SUMMARY MEDICAL DECISION MAKING FREE TEXT BOX
blood in stool, near syncope, high risk for active GI bleeding, will admit pending GI consultation and further evaluation in patient including trending of H/H and to transfuse as needed.

## 2020-03-24 NOTE — CONSULT NOTE ADULT - ASSESSMENT
34F with acute GI bleeding, likely LGIB given tenderness. Consider colitis/ischemic colitis, diverticular bleeding although young. Also consider PUD with rapid transit given syncope, although no melena and this should not cause lower tenderness. Pt is now stable, hgb stable (although was given blood) and no further bleeding in hospital.    Rec:  -f/u CBC just done  -PPI once daily, continue after discharge for 30 days (empiric)  -resume regular diet  -ideally would have endoscopic eval but no cases are being done due to COVID-19 pandemic unless absolutely necessary and pt currently stable  -d/c home if yasemin PO and hgb acceptable  -d/w pt, she should follow up in a few months when routine testing available.

## 2020-03-25 LAB
ANION GAP SERPL CALC-SCNC: 4 MMOL/L — LOW (ref 5–17)
BUN SERPL-MCNC: 12 MG/DL — SIGNIFICANT CHANGE UP (ref 7–23)
CALCIUM SERPL-MCNC: 7.9 MG/DL — LOW (ref 8.5–10.1)
CHLORIDE SERPL-SCNC: 111 MMOL/L — HIGH (ref 96–108)
CO2 SERPL-SCNC: 26 MMOL/L — SIGNIFICANT CHANGE UP (ref 22–31)
CREAT SERPL-MCNC: 0.55 MG/DL — SIGNIFICANT CHANGE UP (ref 0.5–1.3)
GLUCOSE SERPL-MCNC: 83 MG/DL — SIGNIFICANT CHANGE UP (ref 70–99)
HCT VFR BLD CALC: 28.6 % — LOW (ref 34.5–45)
HCT VFR BLD CALC: 29.3 % — LOW (ref 34.5–45)
HGB BLD-MCNC: 9.6 G/DL — LOW (ref 11.5–15.5)
HGB BLD-MCNC: 9.9 G/DL — LOW (ref 11.5–15.5)
MCHC RBC-ENTMCNC: 29.3 PG — SIGNIFICANT CHANGE UP (ref 27–34)
MCHC RBC-ENTMCNC: 29.4 PG — SIGNIFICANT CHANGE UP (ref 27–34)
MCHC RBC-ENTMCNC: 33.6 GM/DL — SIGNIFICANT CHANGE UP (ref 32–36)
MCHC RBC-ENTMCNC: 33.8 GM/DL — SIGNIFICANT CHANGE UP (ref 32–36)
MCV RBC AUTO: 86.7 FL — SIGNIFICANT CHANGE UP (ref 80–100)
MCV RBC AUTO: 87.7 FL — SIGNIFICANT CHANGE UP (ref 80–100)
PLATELET # BLD AUTO: 230 K/UL — SIGNIFICANT CHANGE UP (ref 150–400)
PLATELET # BLD AUTO: 250 K/UL — SIGNIFICANT CHANGE UP (ref 150–400)
POTASSIUM SERPL-MCNC: 3.9 MMOL/L — SIGNIFICANT CHANGE UP (ref 3.5–5.3)
POTASSIUM SERPL-SCNC: 3.9 MMOL/L — SIGNIFICANT CHANGE UP (ref 3.5–5.3)
RBC # BLD: 3.26 M/UL — LOW (ref 3.8–5.2)
RBC # BLD: 3.38 M/UL — LOW (ref 3.8–5.2)
RBC # FLD: 13.1 % — SIGNIFICANT CHANGE UP (ref 10.3–14.5)
RBC # FLD: 13.2 % — SIGNIFICANT CHANGE UP (ref 10.3–14.5)
SODIUM SERPL-SCNC: 141 MMOL/L — SIGNIFICANT CHANGE UP (ref 135–145)
WBC # BLD: 6.29 K/UL — SIGNIFICANT CHANGE UP (ref 3.8–10.5)
WBC # BLD: 7.98 K/UL — SIGNIFICANT CHANGE UP (ref 3.8–10.5)
WBC # FLD AUTO: 6.29 K/UL — SIGNIFICANT CHANGE UP (ref 3.8–10.5)
WBC # FLD AUTO: 7.98 K/UL — SIGNIFICANT CHANGE UP (ref 3.8–10.5)

## 2020-03-25 PROCEDURE — 99232 SBSQ HOSP IP/OBS MODERATE 35: CPT

## 2020-03-25 RX ADMIN — PANTOPRAZOLE SODIUM 40 MILLIGRAM(S): 20 TABLET, DELAYED RELEASE ORAL at 05:10

## 2020-03-25 RX ADMIN — SODIUM CHLORIDE 125 MILLILITER(S): 9 INJECTION INTRAMUSCULAR; INTRAVENOUS; SUBCUTANEOUS at 06:28

## 2020-03-25 NOTE — PROGRESS NOTE ADULT - SUBJECTIVE AND OBJECTIVE BOX
GI    had blood in stool today, darker red, then another small darker BM  no active fresh blood  hgb down  no add'l blood given  yasemin PO  pain more localized to L abd and less than yest    PE: NAD  abd mild LLQ tenderness                          9.9    7.98  )-----------( 250      ( 25 Mar 2020 14:58 )             29.3     a/p  LGIB, resolving  likely passing old blood  unclear source-diverticular/ischemic colitis/hemorrhoids/neoplasm. Seems less likely UGIB.    Plan:  -no inpatient endoscopy/colonoscopy given COVID situation  -expect hgb to stabilize and can d/c home once it does  -she was given my information and [facility] information and was instructed to arrange colonoscopy once routine testing available again in a few months  -if rebleeds she will go back to ER  -No GI objection to d/c  -pt requests a week off work, will defer this to discharging doctor

## 2020-03-25 NOTE — PROGRESS NOTE ADULT - SUBJECTIVE AND OBJECTIVE BOX
CC-  GI bleed (24 Mar 2020 18:16)    HPI:  34 year old female patient presented to the ED after 4 episodes of bright red blood per rectum during bowel movements that were associated with mild abdominal cramping. Patient Denies any nausea, vomiting, dizziness, presyncope, recent NSAID or alcohol use.   In the ED patient was found to have a H&H of . Patient had a brief syncopal episode while attempting to have another bowel movement, which was noted for blood. A CTA abdomen/pelvis was negative for an acute hemorrhage. Patient transfused 2 units PRBC (24 Mar 2020 03:52)    3/25/20- still with rectal bleed, +LLQ abd pain    Review of system- All 10 systems reviewed and is as per HPI otherwise negative.     T(C): 36.9 (20 @ 10:51), Max: 37.2 (20 @ 17:28)  HR: 84 (20 @ 10:51) (68 - 84)  BP: 108/62 (20 @ 10:51) (108/56 - 110/50)  RR: 16 (20 @ 10:51) (16 - 16)  SpO2: 99% (20 @ 10:51) (99% - 100%)  Wt(kg): --    LABS:                        9.9    7.98  )-----------( 250      ( 25 Mar 2020 14:58 )             29.3     141  |  111<H>  |  12  ----------------------------<  83  3.9   |  26  |  0.55    Ca    7.9<L>      25 Mar 2020 06:33    TPro  7.6  /  Alb  3.6  /  TBili  0.3  /  DBili  x   /  AST  15  /  ALT  20  /  AlkPhos  80  03-23    Urinalysis Basic - ( 23 Mar 2020 20:46 )  Color: Yellow / Appearance: Clear / S.020 / pH: x  Gluc: x / Ketone: Negative  / Bili: Negative / Urobili: Negative mg/dL   Blood: x / Protein: Negative mg/dL / Nitrite: Negative   Leuk Esterase: Negative / RBC: 3-5 /HPF / WBC Negative   Sq Epi: x / Non Sq Epi: Negative / Bacteria: Occasional    POCT Blood Glucose.: 100 mg/dL (23 Mar 2020 21:05)    RADIOLOGY & ADDITIONAL TESTS:  EXAM:  CT ANGIO ABD PELVIS (W)AW IC                        PROCEDURE DATE:  2020    INTERPRETATION:  CLINICAL INFORMATION: GI bleeding. Syncope.    COMPARISON: None.    PROCEDURE:   CT of the Abdomen and Pelvis was performed with and without intravenous contrast.  Precontrast, Arterial and Delayed phases were performed.  Intravenous contrast: 90 ml Omnipaque 350. 10 ml discarded.  Oral contrast: None.  Sagittal and coronal reformats were performed. MIP reformats were obtained.    FINDINGS:    LOWER CHEST: Within normal limits.    LIVER: Within normal limits.  BILE DUCTS: Normal caliber.  GALLBLADDER: Within normal limits.  SPLEEN: Within normal limits.  PANCREAS: Within normal limits.  ADRENALS: Within normal limits.  KIDNEYS/URETERS: Within normal limits.    BLADDER: Within normal limits.  REPRODUCTIVE ORGANS: 3.9 cm right adnexal cyst. Uterus and left adnexa are unremarkable.    BOWEL: No bowel obstruction. Appendix is normal.  PERITONEUM: No ascites.  VESSELS: Within normal limits.  RETROPERITONEUM/LYMPH NODES: No lymphadenopathy.    ABDOMINAL WALL: Small fat-containing umbilical hernia.  BONES: Within normal limits.    IMPRESSION:     No evidence of acute gastrointestinal hemorrhage on CTA.  3.9 cm right adnexal cyst. Evaluation with pelvic sonogram may be obtained as clinically warranted.    PHYSICAL EXAM:  GENERAL: NAD, well-groomed, well-developed  HEAD:  Atraumatic, Normocephalic  EYES: EOMI, PERRLA, conjunctiva and sclera clear  HEENT: Moist mucous membranes  NECK: Supple, No JVD  NERVOUS SYSTEM:  Alert & Oriented X3, Motor Strength 5/5 B/L upper and lower extremities; DTRs 2+ intact and symmetric  CHEST/LUNG: Clear to auscultation bilaterally; No rales, rhonchi, wheezing, or rubs  HEART: Regular rate and rhythm; No murmurs, rubs, or gallops  ABDOMEN: Soft, Nontender, Nondistended; Bowel sounds present  GENITOURINARY- Voiding, no palpable bladder  EXTREMITIES:  2+ Peripheral Pulses, No clubbing, cyanosis, or edema  MUSCULOSKELTAL- No muscle tenderness, Muscle tone normal, No joint tenderness, no Joint swelling, Joint range of motion-normal  SKIN-no rash, no lesion  CNS- alert, oriented X3, non focal       influenza   Vaccine 0.5 milliLiter(s) IntraMuscular once  ondansetron Injectable 4 milliGRAM(s) IV Push every 6 hours PRN  pantoprazole    Tablet 40 milliGRAM(s) Oral before breakfast  sodium chloride 0.9%. 1000 milliLiter(s) IV Continuous <Continuous>    Assessment/Plan  #GI bleed  #Anemia acute blood loss  GI eval appreciated   trending down  Still reports some rectal bleeding and LLQ discomfort  Cont to monitor HH    # Vasovagal syncope  Resolved    #DVT ppx  -encourage ambulation    #Dispo- monitor HH, GI f/u. D/w pt and DR Navarrete

## 2020-03-26 ENCOUNTER — TRANSCRIPTION ENCOUNTER (OUTPATIENT)
Age: 35
End: 2020-03-26

## 2020-03-26 ENCOUNTER — EMERGENCY (EMERGENCY)
Facility: HOSPITAL | Age: 35
LOS: 0 days | Discharge: HOME | End: 2020-03-27
Attending: EMERGENCY MEDICINE
Payer: MEDICAID

## 2020-03-26 VITALS
HEART RATE: 83 BPM | SYSTOLIC BLOOD PRESSURE: 109 MMHG | OXYGEN SATURATION: 100 % | RESPIRATION RATE: 16 BRPM | TEMPERATURE: 98 F | DIASTOLIC BLOOD PRESSURE: 60 MMHG

## 2020-03-26 VITALS
DIASTOLIC BLOOD PRESSURE: 63 MMHG | HEART RATE: 96 BPM | SYSTOLIC BLOOD PRESSURE: 115 MMHG | RESPIRATION RATE: 16 BRPM | OXYGEN SATURATION: 100 % | TEMPERATURE: 99 F

## 2020-03-26 VITALS — HEIGHT: 65 IN | WEIGHT: 179.9 LBS

## 2020-03-26 DIAGNOSIS — K62.5 HEMORRHAGE OF ANUS AND RECTUM: ICD-10-CM

## 2020-03-26 LAB
BASOPHILS # BLD AUTO: 0.03 K/UL — SIGNIFICANT CHANGE UP (ref 0–0.2)
BASOPHILS NFR BLD AUTO: 0.3 % — SIGNIFICANT CHANGE UP (ref 0–2)
EOSINOPHIL # BLD AUTO: 0.07 K/UL — SIGNIFICANT CHANGE UP (ref 0–0.5)
EOSINOPHIL NFR BLD AUTO: 0.6 % — SIGNIFICANT CHANGE UP (ref 0–6)
HCT VFR BLD CALC: 29.2 % — LOW (ref 34.5–45)
HCT VFR BLD CALC: 30.4 % — LOW (ref 34.5–45)
HGB BLD-MCNC: 10.2 G/DL — LOW (ref 11.5–15.5)
HGB BLD-MCNC: 9.9 G/DL — LOW (ref 11.5–15.5)
IMM GRANULOCYTES NFR BLD AUTO: 0.3 % — SIGNIFICANT CHANGE UP (ref 0–1.5)
LYMPHOCYTES # BLD AUTO: 2.51 K/UL — SIGNIFICANT CHANGE UP (ref 1–3.3)
LYMPHOCYTES # BLD AUTO: 22.7 % — SIGNIFICANT CHANGE UP (ref 13–44)
MCHC RBC-ENTMCNC: 29.1 PG — SIGNIFICANT CHANGE UP (ref 27–34)
MCHC RBC-ENTMCNC: 29.5 PG — SIGNIFICANT CHANGE UP (ref 27–34)
MCHC RBC-ENTMCNC: 33.6 GM/DL — SIGNIFICANT CHANGE UP (ref 32–36)
MCHC RBC-ENTMCNC: 33.9 GM/DL — SIGNIFICANT CHANGE UP (ref 32–36)
MCV RBC AUTO: 86.6 FL — SIGNIFICANT CHANGE UP (ref 80–100)
MCV RBC AUTO: 86.9 FL — SIGNIFICANT CHANGE UP (ref 80–100)
MONOCYTES # BLD AUTO: 0.66 K/UL — SIGNIFICANT CHANGE UP (ref 0–0.9)
MONOCYTES NFR BLD AUTO: 6 % — SIGNIFICANT CHANGE UP (ref 2–14)
NEUTROPHILS # BLD AUTO: 7.74 K/UL — HIGH (ref 1.8–7.4)
NEUTROPHILS NFR BLD AUTO: 70.1 % — SIGNIFICANT CHANGE UP (ref 43–77)
PLATELET # BLD AUTO: 286 K/UL — SIGNIFICANT CHANGE UP (ref 150–400)
PLATELET # BLD AUTO: 296 K/UL — SIGNIFICANT CHANGE UP (ref 150–400)
RBC # BLD: 3.36 M/UL — LOW (ref 3.8–5.2)
RBC # BLD: 3.51 M/UL — LOW (ref 3.8–5.2)
RBC # FLD: 12.7 % — SIGNIFICANT CHANGE UP (ref 10.3–14.5)
RBC # FLD: 12.8 % — SIGNIFICANT CHANGE UP (ref 10.3–14.5)
WBC # BLD: 11.04 K/UL — HIGH (ref 3.8–10.5)
WBC # BLD: 8.14 K/UL — SIGNIFICANT CHANGE UP (ref 3.8–10.5)
WBC # FLD AUTO: 11.04 K/UL — HIGH (ref 3.8–10.5)
WBC # FLD AUTO: 8.14 K/UL — SIGNIFICANT CHANGE UP (ref 3.8–10.5)

## 2020-03-26 PROCEDURE — 99239 HOSP IP/OBS DSCHRG MGMT >30: CPT

## 2020-03-26 PROCEDURE — 85025 COMPLETE CBC W/AUTO DIFF WBC: CPT

## 2020-03-26 PROCEDURE — 36415 COLL VENOUS BLD VENIPUNCTURE: CPT

## 2020-03-26 PROCEDURE — 99283 EMERGENCY DEPT VISIT LOW MDM: CPT

## 2020-03-26 RX ORDER — ACETAMINOPHEN 500 MG
650 TABLET ORAL ONCE
Refills: 0 | Status: COMPLETED | OUTPATIENT
Start: 2020-03-26 | End: 2020-03-26

## 2020-03-26 RX ORDER — FOLIC ACID 0.8 MG
0 TABLET ORAL
Qty: 0 | Refills: 0 | DISCHARGE

## 2020-03-26 RX ORDER — SODIUM CHLORIDE 9 MG/ML
1000 INJECTION INTRAMUSCULAR; INTRAVENOUS; SUBCUTANEOUS ONCE
Refills: 0 | Status: COMPLETED | OUTPATIENT
Start: 2020-03-26 | End: 2020-03-26

## 2020-03-26 RX ORDER — PANTOPRAZOLE SODIUM 20 MG/1
1 TABLET, DELAYED RELEASE ORAL
Qty: 30 | Refills: 0
Start: 2020-03-26 | End: 2020-04-24

## 2020-03-26 RX ADMIN — Medication 650 MILLIGRAM(S): at 06:49

## 2020-03-26 RX ADMIN — PANTOPRAZOLE SODIUM 40 MILLIGRAM(S): 20 TABLET, DELAYED RELEASE ORAL at 06:28

## 2020-03-26 RX ADMIN — INFLUENZA VIRUS VACCINE 0.5 MILLILITER(S): 15; 15; 15; 15 SUSPENSION INTRAMUSCULAR at 11:04

## 2020-03-26 RX ADMIN — Medication 650 MILLIGRAM(S): at 07:19

## 2020-03-26 RX ADMIN — SODIUM CHLORIDE 1000 MILLILITER(S): 9 INJECTION INTRAMUSCULAR; INTRAVENOUS; SUBCUTANEOUS at 22:48

## 2020-03-26 NOTE — ED ADULT TRIAGE NOTE - CHIEF COMPLAINT QUOTE
Pt presents to ER c/o rectal bleeding and increased weakness. Onset of symptoms began on Monday. Pt was seen in  ER yesterday r/t similar symptoms. Pt reports increased weakness as symptoms progressed. Pt reports bright red blood in stool.

## 2020-03-26 NOTE — DISCHARGE NOTE NURSING/CASE MANAGEMENT/SOCIAL WORK - PATIENT PORTAL LINK FT
You can access the FollowMyHealth Patient Portal offered by [facility] by registering at the following website: [email or web address]. By joining [employer]’s FollowMyHealth portal, you will also be able to view your health information using other applications (apps) compatible with our system.

## 2020-03-26 NOTE — DISCHARGE NOTE PROVIDER - NSDCCPCAREPLAN_GEN_ALL_CORE_FT
PRINCIPAL DISCHARGE DIAGNOSIS  Diagnosis: Gastrointestinal hemorrhage, unspecified gastrointestinal hemorrhage type  Assessment and Plan of Treatment: outpatient f/u

## 2020-03-26 NOTE — ED STATDOCS - ATTENDING CONTRIBUTION TO CARE
Patient awake and alert, in no apparent distress. Vital signs stable. Discharge instructions given to mother, father. 2 prescriptions given with teaching. Verbalization of understanding of instructions, follow-up instructions and return precautions by mother, father. Patient ambulatory out of department. Discharged home.      Attending Contribution to Care: I, Shruthi Roberson, performed the initial face to face bedside interview with this patient regarding history of present illness, review of symptoms and relevant past medical, social and family history.  I completed an independent physical examination.  I was the initial provider who evaluated this patient. I have signed out the follow up of any pending tests (i.e. labs, radiological studies) to the ACP.  I have communicated the patient’s plan of care and disposition with the ACP.

## 2020-03-26 NOTE — DISCHARGE NOTE PROVIDER - CARE PROVIDER_API CALL
Ganga Navarrete)  Gastroenterology; Internal Medicine  [address], [address]  [place] [address]  Phone: [phone]  Fax: [phone]  Follow Up Time: 1 month

## 2020-03-26 NOTE — ED STATDOCS - PROGRESS NOTE DETAILS
33 y/o female with no pertinent PMHx or surgical hx, presents to the ED c/o rectal bleeding. On 3/23 with rectal bleeding. Was admitted on 3/24 and transfused for rectal bleeding Had CT scan negative for source of bleeding. Pt was seen by GI and given protonix. Was discharge for outpatient colonoscopy, as inpatient colonoscopy was not recommended with covid pandemic. Today, had 6 episodes of rectal bleeding, so returned to the ER. No other complaints at this time.   Sylvia Day PA-C Hg 9.9.  Received 1L NS.  DC with PMD follow up.  Sylvia Day PA-C

## 2020-03-26 NOTE — ED ADULT NURSE NOTE - OBJECTIVE STATEMENT
Pt presents to ER +COVID c/o increased weakness, SOB, v/d. Onset of symptoms began 10 days ago. Pt reports CT at [facility] resulted with PNA

## 2020-03-26 NOTE — ED STATDOCS - PATIENT PORTAL LINK FT
You can access the FollowMyHealth Patient Portal offered by [facility] by registering at the following website: [email or web address]. By joining [facility]’s FollowMyHealth portal, you will also be able to view your health information using other applications (apps) compatible with our system.

## 2020-03-26 NOTE — ED STATDOCS - CLINICAL SUMMARY MEDICAL DECISION MAKING FREE TEXT BOX
33 y/o with painless rectal bleeding. Unknown source. Already had admission and GI evaluation. Was d/c from hospital today. if Pt is orthostatic, will give IV fluids. If pt has a drop in her hemoglobin below 7, will admit and transfuse. Otherwise, pt will continue recommendations to follow up with Dr. Navarrete in office. 33 y/o with painless rectal bleeding. Unknown source. Already had admission and GI evaluation. Was d/c from hospital today. if Pt is orthostatic, will give IV fluids. If pt has a drop in her hemoglobin below 7, will admit and transfuse. Otherwise, pt will continue recommendations to follow up with Dr. Navarrete in office    Hg 9.9.  Received 1L NS.  DC with PMD follow up.  Sylvia Day PA-C.

## 2020-03-26 NOTE — DISCHARGE NOTE PROVIDER - HOSPITAL COURSE
Patient presented with lower GI bleed. HH stable. Seen by GI. NO ability to perform inpatient colonoscopy due to COVID pandemic. Patient to f/u as outpatient with DR Navarrete to determine the appropriate timing for the procedure

## 2020-03-26 NOTE — ED STATDOCS - CARE PROVIDER_API CALL
Ganga Navarrete)  Gastroenterology; Internal Medicine  [address], [address]  [place] [address]  Phone: [phone]  Fax: [phone]  Follow Up Time:

## 2020-03-26 NOTE — PROGRESS NOTE ADULT - SUBJECTIVE AND OBJECTIVE BOX
CC-  GI bleed (24 Mar 2020 18:16)    HPI:  34 year old female patient presented to the ED after 4 episodes of bright red blood per rectum during bowel movements that were associated with mild abdominal cramping. Patient Denies any nausea, vomiting, dizziness, presyncope, recent NSAID or alcohol use.   In the ED patient was found to have a H&H of 12/35. Patient had a brief syncopal episode while attempting to have another bowel movement, which was noted for blood. A CTA abdomen/pelvis was negative for an acute hemorrhage. Patient transfused 2 units PRBC (24 Mar 2020 03:52)    3/25/20- still with rectal bleed, +LLQ abd pain  3/26/20 still had small amount of rectal bleed. HH stable. No ability to do colonoscopy inhouse at present due to COVID pandemic    Review of system- All 10 systems reviewed and is as per HPI otherwise negative.     Vital Signs Last 24 Hrs  T(C): 36.6 (26 Mar 2020 04:46), Max: 37 (25 Mar 2020 17:25)  T(F): 97.9 (26 Mar 2020 04:46), Max: 98.6 (25 Mar 2020 17:25)  HR: 72 (26 Mar 2020 04:46) (72 - 76)  BP: 103/53 (26 Mar 2020 04:46) (102/58 - 110/61)  BP(mean): --  RR: 16 (26 Mar 2020 04:46) (16 - 16)  SpO2: 99% (26 Mar 2020 04:46) (99% - 100%)    LABS:                                10.2   8.14  )-----------( 286      ( 26 Mar 2020 08:48 )             30.4     25 Mar 2020 06:33    141    |  111    |  12     ----------------------------<  83     3.9     |  26     |  0.55     Ca    7.9        25 Mar 2020 06:33    RADIOLOGY & ADDITIONAL TESTS:  EXAM:  CT ANGIO ABD PELVIS (W)AW IC                        PROCEDURE DATE:  03/23/2020    INTERPRETATION:  CLINICAL INFORMATION: GI bleeding. Syncope.    COMPARISON: None.    PROCEDURE:   CT of the Abdomen and Pelvis was performed with and without intravenous contrast.  Precontrast, Arterial and Delayed phases were performed.  Intravenous contrast: 90 ml Omnipaque 350. 10 ml discarded.  Oral contrast: None.  Sagittal and coronal reformats were performed. MIP reformats were obtained.    FINDINGS:    LOWER CHEST: Within normal limits.    LIVER: Within normal limits.  BILE DUCTS: Normal caliber.  GALLBLADDER: Within normal limits.  SPLEEN: Within normal limits.  PANCREAS: Within normal limits.  ADRENALS: Within normal limits.  KIDNEYS/URETERS: Within normal limits.    BLADDER: Within normal limits.  REPRODUCTIVE ORGANS: 3.9 cm right adnexal cyst. Uterus and left adnexa are unremarkable.    BOWEL: No bowel obstruction. Appendix is normal.  PERITONEUM: No ascites.  VESSELS: Within normal limits.  RETROPERITONEUM/LYMPH NODES: No lymphadenopathy.    ABDOMINAL WALL: Small fat-containing umbilical hernia.  BONES: Within normal limits.    IMPRESSION:     No evidence of acute gastrointestinal hemorrhage on CTA.  3.9 cm right adnexal cyst. Evaluation with pelvic sonogram may be obtained as clinically warranted.    PHYSICAL EXAM:  GENERAL: NAD, well-groomed, well-developed  HEAD:  Atraumatic, Normocephalic  EYES: EOMI, PERRLA, conjunctiva and sclera clear  HEENT: Moist mucous membranes  NECK: Supple, No JVD  NERVOUS SYSTEM:  Alert & Oriented X3, Motor Strength 5/5 B/L upper and lower extremities; DTRs 2+ intact and symmetric  CHEST/LUNG: Clear to auscultation bilaterally; No rales, rhonchi, wheezing, or rubs  HEART: Regular rate and rhythm; No murmurs, rubs, or gallops  ABDOMEN: Soft, Nontender, Nondistended; Bowel sounds present  GENITOURINARY- Voiding, no palpable bladder  EXTREMITIES:  2+ Peripheral Pulses, No clubbing, cyanosis, or edema  MUSCULOSKELTAL- No muscle tenderness, Muscle tone normal, No joint tenderness, no Joint swelling, Joint range of motion-normal  SKIN-no rash, no lesion  CNS- alert, oriented X3, non focal       influenza   Vaccine 0.5 milliLiter(s) IntraMuscular once  ondansetron Injectable 4 milliGRAM(s) IV Push every 6 hours PRN  pantoprazole    Tablet 40 milliGRAM(s) Oral before breakfast  sodium chloride 0.9%. 1000 milliLiter(s) IV Continuous <Continuous>    Assessment/Plan  #GI bleed lower  #Anemia acute blood loss  GI eval appreciated  HH stabilized  Still reports some rectal bleeding and LLQ discomfort  D/w DR Gandolfo- there is no ability to do colonoscopy at present due to COVID pandemic and patient is stable  Patient notified about it and will be discharged for outpatient f/u    # Vasovagal syncope  Resolved    #DVT ppx  -encourage ambulation    #Dispo- home today. F/u with DR Navarrete as outpatient. DC time 40 mins

## 2020-03-26 NOTE — ED ADULT NURSE NOTE - CHIEF COMPLAINT QUOTE
Pt presents to ER +COVID c/o increased weakness, SOB, v/d. Onset of symptoms began 10 days ago. Pt reports CT at [facility] resulted with PNA

## 2020-03-26 NOTE — ED STATDOCS - OBJECTIVE STATEMENT
33 y/o female with no pertinent PMHx or surgical hx, presents to the ED c/o rectal bleeding. On 3/23 with rectal bleeding. Was admitted on 3/24 and transfused for rectal bleeding Had CT scan negative for source of bleeding. Pt was seen by GI and given protonix. Was discharge for outpatient colonoscopy, as inpatient colonoscopy was not recommended with covid pandemic. Today, had 6 episodes of rectal bleeding, so returned to the ER. No other complaints at this time.

## 2020-03-27 ENCOUNTER — INPATIENT (INPATIENT)
Facility: HOSPITAL | Age: 35
LOS: 2 days | Discharge: HOME | DRG: 229 | End: 2020-03-30
Attending: FAMILY MEDICINE | Admitting: FAMILY MEDICINE
Payer: MEDICAID

## 2020-03-27 VITALS — WEIGHT: 134.92 LBS | HEIGHT: 65 IN

## 2020-03-27 DIAGNOSIS — K92.2 GASTROINTESTINAL HEMORRHAGE, UNSPECIFIED: ICD-10-CM

## 2020-03-27 LAB
ALBUMIN SERPL ELPH-MCNC: 4.1 G/DL — SIGNIFICANT CHANGE UP (ref 3.3–5)
ALP SERPL-CCNC: 58 U/L — SIGNIFICANT CHANGE UP (ref 40–120)
ALT FLD-CCNC: 15 U/L — SIGNIFICANT CHANGE UP (ref 12–78)
ANION GAP SERPL CALC-SCNC: 5 MMOL/L — SIGNIFICANT CHANGE UP (ref 5–17)
AST SERPL-CCNC: 7 U/L — LOW (ref 15–37)
BASOPHILS # BLD AUTO: 0.02 K/UL — SIGNIFICANT CHANGE UP (ref 0–0.2)
BASOPHILS NFR BLD AUTO: 0.2 % — SIGNIFICANT CHANGE UP (ref 0–2)
BILIRUB SERPL-MCNC: 0.4 MG/DL — SIGNIFICANT CHANGE UP (ref 0.2–1.2)
BUN SERPL-MCNC: 9 MG/DL — SIGNIFICANT CHANGE UP (ref 7–23)
CALCIUM SERPL-MCNC: 8.8 MG/DL — SIGNIFICANT CHANGE UP (ref 8.5–10.1)
CHLORIDE SERPL-SCNC: 107 MMOL/L — SIGNIFICANT CHANGE UP (ref 96–108)
CO2 SERPL-SCNC: 26 MMOL/L — SIGNIFICANT CHANGE UP (ref 22–31)
CREAT SERPL-MCNC: 0.64 MG/DL — SIGNIFICANT CHANGE UP (ref 0.5–1.3)
EOSINOPHIL # BLD AUTO: 0.04 K/UL — SIGNIFICANT CHANGE UP (ref 0–0.5)
EOSINOPHIL NFR BLD AUTO: 0.4 % — SIGNIFICANT CHANGE UP (ref 0–6)
GLUCOSE SERPL-MCNC: 93 MG/DL — SIGNIFICANT CHANGE UP (ref 70–99)
HCT VFR BLD CALC: 24.9 % — LOW (ref 34.5–45)
HCT VFR BLD CALC: 24.9 % — LOW (ref 34.5–45)
HCT VFR BLD CALC: 29.7 % — LOW (ref 34.5–45)
HGB BLD-MCNC: 10 G/DL — LOW (ref 11.5–15.5)
HGB BLD-MCNC: 8.3 G/DL — LOW (ref 11.5–15.5)
HGB BLD-MCNC: 8.4 G/DL — LOW (ref 11.5–15.5)
IMM GRANULOCYTES NFR BLD AUTO: 0.3 % — SIGNIFICANT CHANGE UP (ref 0–1.5)
INR BLD: 1.11 RATIO — SIGNIFICANT CHANGE UP (ref 0.88–1.16)
LYMPHOCYTES # BLD AUTO: 1.78 K/UL — SIGNIFICANT CHANGE UP (ref 1–3.3)
LYMPHOCYTES # BLD AUTO: 19.6 % — SIGNIFICANT CHANGE UP (ref 13–44)
MCHC RBC-ENTMCNC: 28.9 PG — SIGNIFICANT CHANGE UP (ref 27–34)
MCHC RBC-ENTMCNC: 29.2 PG — SIGNIFICANT CHANGE UP (ref 27–34)
MCHC RBC-ENTMCNC: 29.3 PG — SIGNIFICANT CHANGE UP (ref 27–34)
MCHC RBC-ENTMCNC: 33.3 GM/DL — SIGNIFICANT CHANGE UP (ref 32–36)
MCHC RBC-ENTMCNC: 33.7 GM/DL — SIGNIFICANT CHANGE UP (ref 32–36)
MCHC RBC-ENTMCNC: 33.7 GM/DL — SIGNIFICANT CHANGE UP (ref 32–36)
MCV RBC AUTO: 86.8 FL — SIGNIFICANT CHANGE UP (ref 80–100)
MONOCYTES # BLD AUTO: 0.44 K/UL — SIGNIFICANT CHANGE UP (ref 0–0.9)
MONOCYTES NFR BLD AUTO: 4.8 % — SIGNIFICANT CHANGE UP (ref 2–14)
NEUTROPHILS # BLD AUTO: 6.78 K/UL — SIGNIFICANT CHANGE UP (ref 1.8–7.4)
NEUTROPHILS NFR BLD AUTO: 74.7 % — SIGNIFICANT CHANGE UP (ref 43–77)
PLATELET # BLD AUTO: 285 K/UL — SIGNIFICANT CHANGE UP (ref 150–400)
PLATELET # BLD AUTO: 292 K/UL — SIGNIFICANT CHANGE UP (ref 150–400)
PLATELET # BLD AUTO: 322 K/UL — SIGNIFICANT CHANGE UP (ref 150–400)
POTASSIUM SERPL-MCNC: 4 MMOL/L — SIGNIFICANT CHANGE UP (ref 3.5–5.3)
POTASSIUM SERPL-SCNC: 4 MMOL/L — SIGNIFICANT CHANGE UP (ref 3.5–5.3)
PROT SERPL-MCNC: 7.7 GM/DL — SIGNIFICANT CHANGE UP (ref 6–8.3)
PROTHROM AB SERPL-ACNC: 12.4 SEC — SIGNIFICANT CHANGE UP (ref 10–12.9)
RBC # BLD: 2.87 M/UL — LOW (ref 3.8–5.2)
RBC # BLD: 2.87 M/UL — LOW (ref 3.8–5.2)
RBC # BLD: 3.42 M/UL — LOW (ref 3.8–5.2)
RBC # FLD: 12.7 % — SIGNIFICANT CHANGE UP (ref 10.3–14.5)
RBC # FLD: 12.8 % — SIGNIFICANT CHANGE UP (ref 10.3–14.5)
RBC # FLD: 12.8 % — SIGNIFICANT CHANGE UP (ref 10.3–14.5)
SODIUM SERPL-SCNC: 138 MMOL/L — SIGNIFICANT CHANGE UP (ref 135–145)
WBC # BLD: 8.07 K/UL — SIGNIFICANT CHANGE UP (ref 3.8–10.5)
WBC # BLD: 9.09 K/UL — SIGNIFICANT CHANGE UP (ref 3.8–10.5)
WBC # BLD: 9.2 K/UL — SIGNIFICANT CHANGE UP (ref 3.8–10.5)
WBC # FLD AUTO: 8.07 K/UL — SIGNIFICANT CHANGE UP (ref 3.8–10.5)
WBC # FLD AUTO: 9.09 K/UL — SIGNIFICANT CHANGE UP (ref 3.8–10.5)
WBC # FLD AUTO: 9.2 K/UL — SIGNIFICANT CHANGE UP (ref 3.8–10.5)

## 2020-03-27 PROCEDURE — 80048 BASIC METABOLIC PNL TOTAL CA: CPT

## 2020-03-27 PROCEDURE — C9113: CPT

## 2020-03-27 PROCEDURE — 74177 CT ABD & PELVIS W/CONTRAST: CPT | Mod: 26

## 2020-03-27 PROCEDURE — 85027 COMPLETE CBC AUTOMATED: CPT

## 2020-03-27 PROCEDURE — 81025 URINE PREGNANCY TEST: CPT

## 2020-03-27 PROCEDURE — 76856 US EXAM PELVIC COMPLETE: CPT

## 2020-03-27 PROCEDURE — 85025 COMPLETE CBC W/AUTO DIFF WBC: CPT

## 2020-03-27 PROCEDURE — 36415 COLL VENOUS BLD VENIPUNCTURE: CPT

## 2020-03-27 PROCEDURE — 99223 1ST HOSP IP/OBS HIGH 75: CPT

## 2020-03-27 RX ORDER — PANTOPRAZOLE SODIUM 20 MG/1
40 TABLET, DELAYED RELEASE ORAL EVERY 12 HOURS
Refills: 0 | Status: DISCONTINUED | OUTPATIENT
Start: 2020-03-27 | End: 2020-03-30

## 2020-03-27 RX ORDER — SODIUM CHLORIDE 9 MG/ML
1000 INJECTION INTRAMUSCULAR; INTRAVENOUS; SUBCUTANEOUS ONCE
Refills: 0 | Status: COMPLETED | OUTPATIENT
Start: 2020-03-27 | End: 2020-03-27

## 2020-03-27 RX ADMIN — PANTOPRAZOLE SODIUM 40 MILLIGRAM(S): 20 TABLET, DELAYED RELEASE ORAL at 21:01

## 2020-03-27 RX ADMIN — SODIUM CHLORIDE 1000 MILLILITER(S): 9 INJECTION INTRAMUSCULAR; INTRAVENOUS; SUBCUTANEOUS at 14:51

## 2020-03-27 NOTE — ED ADULT NURSE NOTE - NSIMPLEMENTINTERV_GEN_ALL_ED
Implemented All Universal Safety Interventions:  [place] to call system. Call bell, personal items and telephone within reach. Instruct patient to call for assistance. Room bathroom lighting operational. Non-slip footwear when patient is off stretcher. Physically safe environment: no spills, clutter or unnecessary equipment. Stretcher in lowest position, wheels locked, appropriate side rails in place.

## 2020-03-27 NOTE — H&P ADULT - ASSESSMENT
35 y/o F w/ no known PMHx recently admitted to  from 3/23 -3/26/2020 for evaluation and management of rectal bleeding was referred from [facility] to  today (3/27) for evaluation of recurrent rectal bleeding. The patient reportedly c/o associated abdominal cramping. Labs => Hgb/Hct 10.0/29.7 -> 8.4/24.9. CT ABD/Pelvis => 1.  No CT evidence for bowel obstruction or inflammation. Normal appendix. This patient with rectal bleeding, further clinically indicated evaluation such as colonoscopy should not be deferred on the basis of the results of this exam. 2.  There is a 3.9 cm right adnexal cyst (image 102, series 2) similar to the prior study.  There is an additional 2.0 cm right adnexal cystic structure (image 99, series 2), which is larger compared to the prior exam, previously 1.4 cm Recommend sonographic correlation. In the ED the patient was given NS x 1L x 1. 35 y/o F w/ no known PMHx recently admitted to  from 3/23 -3/26/2020 for evaluation and management of rectal bleeding was referred from [facility] to  today (3/27) for evaluation of recurrent rectal bleeding. The patient reportedly c/o associated LLQ abdominal cramping. Labs => Hgb/Hct 10.0/29.7 -> 8.4/24.9. CT ABD/Pelvis => 1.  No CT evidence for bowel obstruction or inflammation. Normal appendix. This patient with rectal bleeding, further clinically indicated evaluation such as colonoscopy should not be deferred on the basis of the results of this exam. 2.  There is a 3.9 cm right adnexal cyst (image 102, series 2) similar to the prior study.  There is an additional 2.0 cm right adnexal cystic structure (image 99, series 2), which is larger compared to the prior exam, previously 1.4 cm Recommend sonographic correlation. In the ED the patient was given NS x 1L x 1.    #GI bleed  ~admit to Medicine  ~NPO after MN  ~serial abdominal exams  ~Type & Screen  ~f/u w/ GI consultation  ~cont. PPI   ~will transfuse for Hgb < 7mg/dL or > if becomes symptomatic accordingly  ~Gastroenterology to evaluate pt in the am w/ plans for colonoscopy    #Vte ppx  ~IMPROVE Vte Risk Score is 0  ~encourage ambulation

## 2020-03-27 NOTE — H&P ADULT - HISTORY OF PRESENT ILLNESS
33 y/o F w/ no known PMHx recently admitted to  from 3/23 -3/26/2020 for evaluation and management of rectal bleeding was referred from [facility] to  today (3/27) for evaluation of recurrent rectal bleeding. The patient reportedly c/o associated abdominal cramping. Labs => Hgb/Hct 10.0/29.7 -> 8.4/24.9. CT ABD/Pelvis => 1.  No CT evidence for bowel obstruction or inflammation. Normal appendix. This patient with rectal bleeding, further clinically indicated evaluation such as colonoscopy should not be deferred on the basis of the results of this exam. 2.  There is a 3.9 cm right adnexal cyst (image 102, series 2) similar to the prior study.  There is an additional 2.0 cm right adnexal cystic structure (image 99, series 2), which is larger compared to the prior exam, previously 1.4 cm Recommend sonographic correlation. In the ED the patient was given NS x 1L x 1. 33 y/o F w/ no known PMHx recently admitted to  from 3/23 -3/26/2020 for evaluation and management of rectal bleeding was referred from [facility] to  today (3/27) for evaluation of recurrent rectal bleeding. The patient reportedly c/o associated LLQ abdominal cramping. Labs => Hgb/Hct 10.0/29.7 -> 8.4/24.9. CT ABD/Pelvis => 1.  No CT evidence for bowel obstruction or inflammation. Normal appendix. This patient with rectal bleeding, further clinically indicated evaluation such as colonoscopy should not be deferred on the basis of the results of this exam. 2.  There is a 3.9 cm right adnexal cyst (image 102, series 2) similar to the prior study.  There is an additional 2.0 cm right adnexal cystic structure (image 99, series 2), which is larger compared to the prior exam, previously 1.4 cm Recommend sonographic correlation. In the ED the patient was given NS x 1L x 1.

## 2020-03-27 NOTE — PATIENT PROFILE ADULT - NSPROMUTANXFEARFT_GEN_A_NUR
Preventive Health Recommendations:       Male Ages 65 and over    Yearly exam:             See your health care provider every year in order to  o   Review health changes.   o   Discuss preventive care.    o   Review your medicines if your doctor has prescribed any.    Talk with your health care provider about whether you should have a test to screen for prostate cancer (PSA).    Every 3 years, have a diabetes test (fasting glucose). If you are at risk for diabetes, you should have this test more often.    Every 5 years, have a cholesterol test. Have this test more often if you are at risk for high cholesterol or heart disease.     Every 10 years, have a colonoscopy. Or, have a yearly FIT test (stool test). These exams will check for colon cancer.    Talk to with your health care provider about screening for Abdominal Aortic Aneurysm if you have a family history of AAA or have a history of smoking.  Shots:     Get a flu shot each year.     Get a tetanus shot every 10 years.     Talk to your doctor about your pneumonia vaccines. There are now two you should receive - Pneumovax (PPSV 23) and Prevnar (PCV 13).    Talk to your doctor about a shingles vaccine.     Talk to your doctor about the hepatitis B vaccine.  Nutrition:     Eat at least 5 servings of fruits and vegetables each day.     Eat whole-grain bread, whole-wheat pasta and brown rice instead of white grains and rice.     Talk to your doctor about Calcium and Vitamin D.   Lifestyle    Exercise for at least 150 minutes a week (30 minutes a day, 5 days a week). This will help you control your weight and prevent disease.     Limit alcohol to one drink per day.     No smoking.     Wear sunscreen to prevent skin cancer.     See your dentist every six months for an exam and cleaning.     See your eye doctor every 1 to 2 years to screen for conditions such as glaucoma, macular degeneration and cataracts.   none

## 2020-03-27 NOTE — ED STATDOCS - OBJECTIVE STATEMENT
33 y/o f with no PMHx or surgical hx presenting to the ED c/o continued bright red rectal bleeding, fatigue, SOB, LLQ abd pain.

## 2020-03-27 NOTE — H&P ADULT - NSHPPHYSICALEXAM_GEN_ALL_CORE
Vital Signs Last 24 Hrs  T(C): 37.2 (27 Mar 2020 14:45), Max: 37.2 (26 Mar 2020 21:51)  T(F): 99 (27 Mar 2020 14:45), Max: 99 (27 Mar 2020 14:45)  HR: 105 (27 Mar 2020 12:55) (96 - 105)  BP: 136/97 (27 Mar 2020 12:55) (115/63 - 136/97)  BP(mean): 111 (27 Mar 2020 12:55) (75 - 111)  RR: 20 (27 Mar 2020 12:55) (16 - 20)  SpO2: 100% (27 Mar 2020 12:55) (100% - 100%)

## 2020-03-27 NOTE — ED STATDOCS - PROGRESS NOTE DETAILS
33 y/o f with no PMHx or surgical hx presenting to the ED c/o continued bright red rectal bleeding, fatigue, SOB, LLQ abd pain.   Pt. admitted for same and was DC with outpatient GI followup by Dr. Navarrete.  Returned yesterday and Hg 9.  Will repeat labs, CT as patient has LLQ pain.  Sylvia Day PA-C CT showing new right adnexal cyst.  No other acute findings except for previous adnexal cyst.  H/H stable.  will page Dr. Castillo.  Sylvia Day PA-C Dr. Webster requested I call Dr. Navarrete.  Dr. Navarrete not on call.  Paged Dr. Webster again.  Sylvia Day PA-C Dr. Navarrete called and will perform colonoscopy.  Admitted to medicine.  Sylvia Day PA-C

## 2020-03-27 NOTE — ED ADULT NURSE REASSESSMENT NOTE - NS ED NURSE REASSESS COMMENT FT1
Pt hesitant to go home. Pt concerned that she will pass out at home from the rectal bleeding. MD Roberson made aware of patients concerns. Spoke with patient about follow up with gastroenterology. Pt understands importance and will try to get a follow up appointment and possible colonoscopy as soon as available. Pt agrees to discharge and is aware of plan at this time. PIV removed, discharge paperwork signed by patient and RN.

## 2020-03-27 NOTE — ED ADULT TRIAGE NOTE - CHIEF COMPLAINT QUOTE
Sent from [facility] for BRBPR and HGB drop from 9.9 to 8.0 with abdominal pain and cramping

## 2020-03-27 NOTE — ED ADULT NURSE NOTE - OBJECTIVE STATEMENT
Pt c/o rectal bleeding. pt states that after  every  meal she has stool with red bright blood also c/o abd pain .

## 2020-03-28 LAB
HCT VFR BLD CALC: 25.6 % — LOW (ref 34.5–45)
HCT VFR BLD CALC: 26 % — LOW (ref 34.5–45)
HCT VFR BLD CALC: 26.2 % — LOW (ref 34.5–45)
HGB BLD-MCNC: 8.6 G/DL — LOW (ref 11.5–15.5)
HGB BLD-MCNC: 8.7 G/DL — LOW (ref 11.5–15.5)
HGB BLD-MCNC: 9 G/DL — LOW (ref 11.5–15.5)
MCHC RBC-ENTMCNC: 29.3 PG — SIGNIFICANT CHANGE UP (ref 27–34)
MCHC RBC-ENTMCNC: 29.4 PG — SIGNIFICANT CHANGE UP (ref 27–34)
MCHC RBC-ENTMCNC: 29.9 PG — SIGNIFICANT CHANGE UP (ref 27–34)
MCHC RBC-ENTMCNC: 33.5 GM/DL — SIGNIFICANT CHANGE UP (ref 32–36)
MCHC RBC-ENTMCNC: 33.6 GM/DL — SIGNIFICANT CHANGE UP (ref 32–36)
MCHC RBC-ENTMCNC: 34.4 GM/DL — SIGNIFICANT CHANGE UP (ref 32–36)
MCV RBC AUTO: 87 FL — SIGNIFICANT CHANGE UP (ref 80–100)
MCV RBC AUTO: 87.1 FL — SIGNIFICANT CHANGE UP (ref 80–100)
MCV RBC AUTO: 87.8 FL — SIGNIFICANT CHANGE UP (ref 80–100)
PLATELET # BLD AUTO: 294 K/UL — SIGNIFICANT CHANGE UP (ref 150–400)
PLATELET # BLD AUTO: 294 K/UL — SIGNIFICANT CHANGE UP (ref 150–400)
PLATELET # BLD AUTO: 316 K/UL — SIGNIFICANT CHANGE UP (ref 150–400)
RBC # BLD: 2.94 M/UL — LOW (ref 3.8–5.2)
RBC # BLD: 2.96 M/UL — LOW (ref 3.8–5.2)
RBC # BLD: 3.01 M/UL — LOW (ref 3.8–5.2)
RBC # FLD: 13 % — SIGNIFICANT CHANGE UP (ref 10.3–14.5)
RBC # FLD: 13 % — SIGNIFICANT CHANGE UP (ref 10.3–14.5)
RBC # FLD: 13.1 % — SIGNIFICANT CHANGE UP (ref 10.3–14.5)
WBC # BLD: 7.66 K/UL — SIGNIFICANT CHANGE UP (ref 3.8–10.5)
WBC # BLD: 7.78 K/UL — SIGNIFICANT CHANGE UP (ref 3.8–10.5)
WBC # BLD: 8.11 K/UL — SIGNIFICANT CHANGE UP (ref 3.8–10.5)
WBC # FLD AUTO: 7.66 K/UL — SIGNIFICANT CHANGE UP (ref 3.8–10.5)
WBC # FLD AUTO: 7.78 K/UL — SIGNIFICANT CHANGE UP (ref 3.8–10.5)
WBC # FLD AUTO: 8.11 K/UL — SIGNIFICANT CHANGE UP (ref 3.8–10.5)

## 2020-03-28 PROCEDURE — 99233 SBSQ HOSP IP/OBS HIGH 50: CPT

## 2020-03-28 RX ORDER — ACETAMINOPHEN 500 MG
1000 TABLET ORAL EVERY 6 HOURS
Refills: 0 | Status: DISCONTINUED | OUTPATIENT
Start: 2020-03-28 | End: 2020-03-30

## 2020-03-28 RX ORDER — ACETAMINOPHEN 500 MG
650 TABLET ORAL EVERY 6 HOURS
Refills: 0 | Status: DISCONTINUED | OUTPATIENT
Start: 2020-03-28 | End: 2020-03-30

## 2020-03-28 RX ORDER — MORPHINE SULFATE 50 MG/1
2 CAPSULE, EXTENDED RELEASE ORAL EVERY 4 HOURS
Refills: 0 | Status: DISCONTINUED | OUTPATIENT
Start: 2020-03-28 | End: 2020-03-30

## 2020-03-28 RX ADMIN — PANTOPRAZOLE SODIUM 40 MILLIGRAM(S): 20 TABLET, DELAYED RELEASE ORAL at 09:59

## 2020-03-28 RX ADMIN — PANTOPRAZOLE SODIUM 40 MILLIGRAM(S): 20 TABLET, DELAYED RELEASE ORAL at 17:28

## 2020-03-28 NOTE — PROGRESS NOTE ADULT - ASSESSMENT
HOSPITAL COURSE    35 y/o F w/ no known PMHx recently admitted to  from 3/23 -3/26/2020 for evaluation and management of rectal bleeding was referred from [facility] to  (3/27) for evaluation of recurrent rectal bleeding. The patient reportedly c/o associated LLQ abdominal cramping. Labs => Hgb/Hct 10.0/29.7 -> 8.4/24.9. CT ABD/Pelvis =>   There is a 3.9 cm right adnexal cyst (image 102, series 2) similar to the prior study.  There is an additional 2.0 cm right adnexal cystic structure (image 99, series 2), which is larger compared to the prior exam, previously 1.4 cm Recommend sonographic correlation. In the ED the patient was given NS x 1L x 1.    03/28 continued to have significant LUQ, LLQ and lower abdominal pain which was tender to palpation    PRIMARY DIAGNOSIS  Abdominal pain, unclear etiology  -GI consult called  -new order for OB/GYN consult  -new orders for sonogram and urine pregnancy test  -new order s for tylenol and morphine    SECONDARY DIAGNOSIS  Acute blood loss anemia secondary to rectal bleeding  -new order for repeat cbc and bmp    Obese

## 2020-03-29 DIAGNOSIS — D62 ACUTE POSTHEMORRHAGIC ANEMIA: ICD-10-CM

## 2020-03-29 DIAGNOSIS — K92.2 GASTROINTESTINAL HEMORRHAGE, UNSPECIFIED: ICD-10-CM

## 2020-03-29 DIAGNOSIS — R55 SYNCOPE AND COLLAPSE: ICD-10-CM

## 2020-03-29 DIAGNOSIS — N83.201 UNSPECIFIED OVARIAN CYST, RIGHT SIDE: ICD-10-CM

## 2020-03-29 LAB
ANION GAP SERPL CALC-SCNC: 5 MMOL/L — SIGNIFICANT CHANGE UP (ref 5–17)
BASOPHILS # BLD AUTO: 0.02 K/UL — SIGNIFICANT CHANGE UP (ref 0–0.2)
BASOPHILS NFR BLD AUTO: 0.3 % — SIGNIFICANT CHANGE UP (ref 0–2)
BUN SERPL-MCNC: 10 MG/DL — SIGNIFICANT CHANGE UP (ref 7–23)
CALCIUM SERPL-MCNC: 8.7 MG/DL — SIGNIFICANT CHANGE UP (ref 8.5–10.1)
CHLORIDE SERPL-SCNC: 106 MMOL/L — SIGNIFICANT CHANGE UP (ref 96–108)
CO2 SERPL-SCNC: 27 MMOL/L — SIGNIFICANT CHANGE UP (ref 22–31)
CREAT SERPL-MCNC: 0.67 MG/DL — SIGNIFICANT CHANGE UP (ref 0.5–1.3)
EOSINOPHIL # BLD AUTO: 0.08 K/UL — SIGNIFICANT CHANGE UP (ref 0–0.5)
EOSINOPHIL NFR BLD AUTO: 1.1 % — SIGNIFICANT CHANGE UP (ref 0–6)
GLUCOSE SERPL-MCNC: 90 MG/DL — SIGNIFICANT CHANGE UP (ref 70–99)
HCG UR QL: NEGATIVE — SIGNIFICANT CHANGE UP
HCT VFR BLD CALC: 25.8 % — LOW (ref 34.5–45)
HGB BLD-MCNC: 8.6 G/DL — LOW (ref 11.5–15.5)
IMM GRANULOCYTES NFR BLD AUTO: 0.3 % — SIGNIFICANT CHANGE UP (ref 0–1.5)
LYMPHOCYTES # BLD AUTO: 1.84 K/UL — SIGNIFICANT CHANGE UP (ref 1–3.3)
LYMPHOCYTES # BLD AUTO: 24.3 % — SIGNIFICANT CHANGE UP (ref 13–44)
MCHC RBC-ENTMCNC: 29.2 PG — SIGNIFICANT CHANGE UP (ref 27–34)
MCHC RBC-ENTMCNC: 33.3 GM/DL — SIGNIFICANT CHANGE UP (ref 32–36)
MCV RBC AUTO: 87.5 FL — SIGNIFICANT CHANGE UP (ref 80–100)
MONOCYTES # BLD AUTO: 0.4 K/UL — SIGNIFICANT CHANGE UP (ref 0–0.9)
MONOCYTES NFR BLD AUTO: 5.3 % — SIGNIFICANT CHANGE UP (ref 2–14)
NEUTROPHILS # BLD AUTO: 5.21 K/UL — SIGNIFICANT CHANGE UP (ref 1.8–7.4)
NEUTROPHILS NFR BLD AUTO: 68.7 % — SIGNIFICANT CHANGE UP (ref 43–77)
PLATELET # BLD AUTO: 321 K/UL — SIGNIFICANT CHANGE UP (ref 150–400)
POTASSIUM SERPL-MCNC: 3.7 MMOL/L — SIGNIFICANT CHANGE UP (ref 3.5–5.3)
POTASSIUM SERPL-SCNC: 3.7 MMOL/L — SIGNIFICANT CHANGE UP (ref 3.5–5.3)
RBC # BLD: 2.95 M/UL — LOW (ref 3.8–5.2)
RBC # FLD: 13.3 % — SIGNIFICANT CHANGE UP (ref 10.3–14.5)
SODIUM SERPL-SCNC: 138 MMOL/L — SIGNIFICANT CHANGE UP (ref 135–145)
WBC # BLD: 7.57 K/UL — SIGNIFICANT CHANGE UP (ref 3.8–10.5)
WBC # FLD AUTO: 7.57 K/UL — SIGNIFICANT CHANGE UP (ref 3.8–10.5)

## 2020-03-29 PROCEDURE — 99233 SBSQ HOSP IP/OBS HIGH 50: CPT

## 2020-03-29 PROCEDURE — 99232 SBSQ HOSP IP/OBS MODERATE 35: CPT

## 2020-03-29 PROCEDURE — 76856 US EXAM PELVIC COMPLETE: CPT | Mod: 26

## 2020-03-29 RX ORDER — SOD SULF/SODIUM/NAHCO3/KCL/PEG
4000 SOLUTION, RECONSTITUTED, ORAL ORAL ONCE
Refills: 0 | Status: COMPLETED | OUTPATIENT
Start: 2020-03-29 | End: 2020-03-29

## 2020-03-29 RX ADMIN — PANTOPRAZOLE SODIUM 40 MILLIGRAM(S): 20 TABLET, DELAYED RELEASE ORAL at 10:09

## 2020-03-29 RX ADMIN — PANTOPRAZOLE SODIUM 40 MILLIGRAM(S): 20 TABLET, DELAYED RELEASE ORAL at 21:11

## 2020-03-29 RX ADMIN — Medication 10 MILLIGRAM(S): at 21:11

## 2020-03-29 RX ADMIN — Medication 4000 MILLILITER(S): at 20:22

## 2020-03-29 NOTE — CONSULT NOTE ADULT - SUBJECTIVE AND OBJECTIVE BOX
35 yo presented dark red rectal bleeding and right sided abd pain for 7 days. Denies previous surgeries, other medical issues. Denies n/v, fever.     PAST MEDICAL & SURGICAL HISTORY:  No significant past medical history  No significant past surgical history    Home Medications:  None    Vital Signs Last 24 Hrs  T(C): 36.9 (29 Mar 2020 10:19), Max: 36.9 (29 Mar 2020 10:19)  T(F): 98.4 (29 Mar 2020 10:19), Max: 98.4 (29 Mar 2020 10:19)  HR: 96 (29 Mar 2020 10:19) (83 - 96)  BP: 106/61 (29 Mar 2020 10:19) (106/61 - 111/60)  BP(mean): --  RR: 17 (29 Mar 2020 10:19) (17 - 18)  SpO2: 100% (29 Mar 2020 10:19) (99% - 100%)    abd soft, non distended, tender in low quadrants                          8.6    7.57  )-----------( 321      ( 29 Mar 2020 07:05 )             25.8   03-29    138  |  106  |  10  ----------------------------<  90  3.7   |  27  |  0.67    Ca    8.7      29 Mar 2020 07:05    TPro  7.7  /  Alb  4.1  /  TBili  0.4  /  DBili  x   /  AST  7<L>  /  ALT  15  /  AlkPhos  58  03-27    < from: CT Abdomen and Pelvis w/ IV Cont (03.27.20 @ 14:47) >  EXAM:  CT ABDOMEN AND PELVIS IC                            PROCEDURE DATE:  03/27/2020          INTERPRETATION:    CT ABDOMEN AND PELVIS WITH CONTRAST    Clinical Indication: LLQ pain, continued rectal bleeding    Technique: Axial CT images of the abdomen and pelvis were obtained from the lung bases to the pubic symphysis after the administration of 90 cc of Optiray 320 IV contrast.  Coronal and sagittal reformatted images were created and reviewed.       Comparison:  Prior CT of the abdomen and pelvis from 3/23/2020.    Findings:  The included lung bases are clear.    The liver is unremarkable aside for a prominent Riedel's lobe. The spleen, pancreas, adrenal glands and kidneys are unremarkable.  The gallbladder has a grossly unremarkable CT appearance. The portal vein is patent. There is no intra or extrahepatic biliary ductal dilatation. There is no evidence for bowel obstruction or inflammation. A normal appendix is identified.    There is no abdominopelvic lymphadenopathy or free fluid. There is a 3.9 cm right adnexal cyst (image 102, series 2) similar to the prior study.  There is an additional 2.0 cm right adnexal cystic structure (image 99, series 2), which is larger compared to the prior exam, previously 1.4 cm. Uterus and left adnexa have an unremarkable CT appearance. The urinary bladder has a grossly unremarkable CT appearance. There is a small fat-containing focal hernia.    No aggressive osseous lesion is identified. Degenerative disc disease is seen at L1-L2 with disc space narrowing and a small posterior disc bulge.    IMPRESSION:  1.  No CT evidence for bowel obstruction or inflammation. Normal appendix. This patient with rectal bleeding, further clinically indicated evaluation such as colonoscopy should notbe deferred on the basis of the results of this exam.    2.  There is a 3.9 cm right adnexal cyst (image 102, series 2) similar to the prior study.  There is an additional 2.0 cm right adnexal cystic structure (image 99, series 2), which is larger compared to the prior exam, previously 1.4 cm Recommend sonographic correlation.    3.  Other findings, as above.             < end of copied text >

## 2020-03-29 NOTE — CONSULT NOTE ADULT - ASSESSMENT
Recurrent GIB and declining hgb, was transfused 2 units last week and hgb continues to drop.  Favor LGIB    Rec:  -will need colonoscopy, cannot d/c home again without intervention as will end up back in ER or with issue from anemia  -discussed possible EGD also, if colonoscopy negative, at same time  -the risks, benefits, and alternatives of colonoscopy +/- EGD were discussed with the patient. We specifically discussed the risk of bleeding, infection, perforation, and the risk of missed lesions. All questions were answered and the patient/proxy agree to proceed with the procedure.  -clears, bowel prep, NPO p MN  -d/w Drs. Purow/Shasha

## 2020-03-29 NOTE — PROGRESS NOTE ADULT - ASSESSMENT
HOSPITAL COURSE    33 y/o F w/ no known PMHx recently admitted to  from 3/23 -3/26/2020 for evaluation and management of rectal bleeding was referred from [facility] to  (3/27) for evaluation of recurrent rectal bleeding. The patient reportedly c/o associated LLQ abdominal cramping. Labs => Hgb/Hct 10.0/29.7 -> 8.4/24.9. CT ABD/Pelvis =>   There is a 3.9 cm right adnexal cyst (image 102, series 2) similar to the prior study.  There is an additional 2.0 cm right adnexal cystic structure (image 99, series 2), which is larger compared to the prior exam, previously 1.4 cm Recommend sonographic correlation. In the ED the patient was given NS x 1L x 1.    03/28 continued to have significant LUQ, LLQ and lower abdominal pain which was tender to palpation    PRIMARY DIAGNOSIS  Abdominal pain, unclear etiology associated with lower GI bleeding  -endoscopy in am  -case discuss with OB/GYN consult  -sonogram of pelvis without critical findings  -pain management  -differential may possibly include a etiology of Mekel's diverticulum or alternative colonic cause    SECONDARY DIAGNOSIS  Acute blood loss anemia secondary to rectal bleeding  -new order for repeat cbc and bmp in am     Obese

## 2020-03-29 NOTE — CONSULT NOTE ADULT - ASSESSMENT
33 yo P1 with rectal bleeding, incidentally noted to have left adnexal mass, likely not the contributing cause to rectal bleeding.    Findings discussed with patient.  Plan to repeat pelvic sonogram in 6-8 weeks recommended in my office.   Will follow patient with you while she is in house.  Thank you for this consultation.    Jb Cruz, [phone] 33 yo P1 with rectal bleeding, incidentally noted to have right ovarian cysts, likely not the contributing cause to rectal bleeding.    Findings discussed with patient.  Plan to repeat pelvic sonogram in 6-8 weeks recommended in my office.   Will follow patient with you while she is in house.  Thank you for this consultation.    Jb Cruz, [phone]

## 2020-03-29 NOTE — CONSULT NOTE ADULT - ASSESSMENT
34F w/ painful GI bleed w/ stable hgb  - rec meckels scan  - rec GI  - CTA and IR if suspect active bleeding  - trend cbc

## 2020-03-29 NOTE — CONSULT NOTE ADULT - CONSULT REASON
left adnexal mass noted during admission for rectal bleeding right adnexal mass noted during admission for rectal bleeding

## 2020-03-29 NOTE — PROGRESS NOTE ADULT - SUBJECTIVE AND OBJECTIVE BOX
Chart reviewed  Case discussed with Nursing, GI and Colorectal Teams    CHIEF COMPLAINT  I still have bad pain    SUBJECTIVE  Pain still with diffuse pain on palpation.  Reports further rectal bleeding with stooling X 2.  no nausea no vomiting    REVIEW OF SYSTEMS  no vomiting, no fevers, no chills, no respiratory distress    PHYSICAL EXAM  AFVSS  Constitutional:   NAD, lying n bed  HEENT:             PERR, EOMI, Neck: Soft and supple, No LAD, No JVD  Respiratory:       Breath sounds are clear bilaterally, No wheezing, rales or rhonchi  Cardiovascular:       S1 and S2, regular rate and rhythm, no Murmurs, gallops or rubs    Gastrointestinal:       Bowel Sounds present, soft, guarded, tender and response to pain, nondistended, rebound not attempted  pain greatest LUQ but significant in lower quadrant included but not limited to LLQ    Extremities:             No peripheral edema  Vascular:                  2+ peripheral pulses  Neurological:           no focal motor deficits, exam limited, moving all extremities  Musculoskeletal:    strength unable to assess today  Skin:                          No rashes    MEDICATIONS:  Reviewed, compared and reconciled    LABS:  03/28 reviewed and compared   of note...  3 unit drop over 4 days from 03/24 to 03/28     DIAGNOSTICS:  CT Abdomen and Pelvis w/ IV Cont (03.27.20 @ 14:47) >  There is a 3.9 cm right adnexal cyst (image 102, series 2) similar to the prior study.  There is an additional 2.0 cm right adnexal cystic structure (image 99, series 2), which is larger compared to the prior exam, previously 1.4 cm Recommend sonographic correlation.    < from: US Pelvis Complete (03.29.20 @ 09:56) >  two simple cysts

## 2020-03-29 NOTE — CONSULT NOTE ADULT - SUBJECTIVE AND OBJECTIVE BOX
GI consult    HPI:  33 y/o F w/ no known PMHx recently admitted to  from 3/23 -3/26/2020 for evaluation and management of rectal bleeding was referred from [facility] to  today (3/27) for evaluation of recurrent rectal bleeding. The patient reportedly c/o associated LLQ abdominal cramping. Labs => Hgb/Hct 10.0/29.7 -> 8.4/24.9. CT ABD/Pelvis => 1.  No CT evidence for bowel obstruction or inflammation. Normal appendix.   Was discharged last week however bleeding persists (red blood per rectum) and returned to ER and having further decline in hgb.    PAST MEDICAL & SURGICAL HISTORY:  No significant past medical history  No significant past surgical history      Home Medications:      MEDICATIONS  (STANDING):  pantoprazole  Injectable 40 milliGRAM(s) IV Push every 12 hours    MEDICATIONS  (PRN):  acetaminophen   Tablet .. 1000 milliGRAM(s) Oral every 6 hours PRN Moderate Pain (4 - 6)  acetaminophen   Tablet .. 650 milliGRAM(s) Oral every 6 hours PRN Mild Pain (1 - 3), Moderate Pain (4 - 6)  morphine  - Injectable 2 milliGRAM(s) IV Push every 4 hours PRN Severe Pain (7 - 10)      Allergies    No Known Allergies    Intolerances        SOCIAL HISTORY:    FAMILY HISTORY:  FH: HTN (hypertension)  Family history of diabetes mellitus (DM)      ROS  As above  Otherwise unremarkable, all systems reviewed    PE:  Vital Signs Last 24 Hrs  T(C): 36.9 (29 Mar 2020 10:19), Max: 36.9 (29 Mar 2020 10:19)  T(F): 98.4 (29 Mar 2020 10:19), Max: 98.4 (29 Mar 2020 10:19)  HR: 96 (29 Mar 2020 10:19) (83 - 96)  BP: 106/61 (29 Mar 2020 10:19) (106/61 - 111/60)  BP(mean): --  RR: 17 (29 Mar 2020 10:19) (17 - 18)  SpO2: 100% (29 Mar 2020 10:19) (99% - 100%)    Constitutional: NAD, well-developed, A+Ox3  Anicteric   Respiratory: CTABL, breathing comfortably  Cardiovascular: S1 and S2, RRR  Gastrointestinal: +BS, soft, mild nonspecific tenderness throughout, non distended, no mass  Extremities: warm, well perfused, no edema  Psychiatric: Normal mood, normal affect  Neuro: moves all extremities, grossly intact  Skin: No rashes or lesions    LABS:                        8.6    7.57  )-----------( 321      ( 29 Mar 2020 07:05 )             25.8     03-29    138  |  106  |  10  ----------------------------<  90  3.7   |  27  |  0.67    Ca    8.7      29 Mar 2020 07:05            RADIOLOGY & ADDITIONAL STUDIES:

## 2020-03-29 NOTE — CONSULT NOTE ADULT - SUBJECTIVE AND OBJECTIVE BOX
35 yo P1 LMP 3/13/2020 presented to  with persistence of rectal bleeding after prior evaluation at hospital.  Pt reports having dark red blood while stooling with regular bowel movements since seven days ago which then became looser movements.  She denies any tarry stool.  She has no history of rectal bleeding prior and no GI issues in general.  She denies any changes in her diet/lifestyle/medications.  She reports having regular menses every 29 days.      Gynhx:    PSH:  2012  Primary - for arrest of descent, at term gestation, uncomplicated.  [facility]      PE:  Vital Signs Last 24 Hrs  T(C): 36.9 (29 Mar 2020 10:19), Max: 36.9 (29 Mar 2020 10:19)  T(F): 98.4 (29 Mar 2020 10:19), Max: 98.4 (29 Mar 2020 10:19)  HR: 96 (29 Mar 2020 10:19) (83 - 96)  BP: 106/61 (29 Mar 2020 10:19) (106/61 - 111/60)P1   BP(mean): --  RR: 17 (29 Mar 2020 10:19) (17 - 18)  SpO2: 100% (29 Mar 2020 10:19) (99% - 100%)    Gen:  pt is resting comfortably in NAD, mild pallor  Pul:  clear B/L  Cor:  S1S2 RRR  Abd;  soft, no rebound, mildly tender to deep palpation at lower quadrants, L>R, focally 2cm left of umbilicus, no palpable mass.           mild RUQ tenderness- generalized.  No Elena's sign.  No psoas sign.  Pelvic:  deferred  Extr:  NT, no edema                          8.6    7.57  )-----------( 321      ( 29 Mar 2020 07:05 )             25.8   CT pelvis:  showed 3.9 cm right adnexal mass and 2cm right adnexal cystic structure (increased from 1.4cm from days prior)    Pelvic sonogram reviewed with technicians- left adnexal cysts of 3cm and 1.6cm-both simple.  No free fluid, no suspicious characteristics, Good doppler flow to adnexa. 33 yo P1 LMP 3/13/2020 presented to  with persistence of rectal bleeding after prior evaluation at hospital.  Pt reports having dark red blood while stooling with regular bowel movements since seven days ago which then became looser movements.  She denies any tarry stool.  She has no history of rectal bleeding prior and no GI issues in general.  She denies any changes in her diet/lifestyle/medications.  She reports having regular menses every 29 days.    OBHx:      PMH:  none   PSH:  2012  Primary - for arrest of descent, at term gestation, uncomplicated.  [facility]      PE:  Vital Signs Last 24 Hrs  T(C): 36.9 (29 Mar 2020 10:19), Max: 36.9 (29 Mar 2020 10:19)  T(F): 98.4 (29 Mar 2020 10:19), Max: 98.4 (29 Mar 2020 10:19)  HR: 96 (29 Mar 2020 10:19) (83 - 96)  BP: 106/61 (29 Mar 2020 10:19) (106/61 - 111/60)P1   BP(mean): --  RR: 17 (29 Mar 2020 10:19) (17 - 18)  SpO2: 100% (29 Mar 2020 10:19) (99% - 100%)    Gen:  pt is resting comfortably in NAD, mild pallor  Pul:  clear B/L  Cor:  S1S2 RRR  Abd;  soft, no rebound, mildly tender to deep palpation at lower quadrants, L>R, focally 2cm left of umbilicus, no palpable mass.           mild RUQ tenderness- generalized.  No Elena's sign.  No psoas sign.  Pelvic:  deferred  Extr:  NT, no edema                          8.6    7.57  )-----------( 321      ( 29 Mar 2020 07:05 )             25.8   CT pelvis:  showed 3.9 cm right adnexal mass and 2cm right adnexal cystic structure (increased from 1.4cm from days prior)    Pelvic sonogram reviewed with technicians- left adnexal cysts of 3cm and 1.6cm-both simple.  No free fluid, no suspicious characteristics, Good doppler flow to adnexa. 33 yo P1 LMP 3/13/2020 presented to  with persistence of rectal bleeding after prior evaluation at hospital on 3/23/2020.  Pt reports having dark red blood while stooling with regular bowel movements since seven days ago which then became looser movements.  She denies any tarry stool.  She has no history of rectal bleeding prior and no GI issues in general.  She denies any changes in her diet/lifestyle/medications.  She reports having regular menses every 29 days.  She denies any active bleeding presently per rectum.      GynHx:  none  OBHx:      PMH:  none   PSH:  2012  Primary - for arrest of descent, at term gestation, uncomplicated.  [facility]      PE:  Vital Signs Last 24 Hrs  T(C): 36.9 (29 Mar 2020 10:19), Max: 36.9 (29 Mar 2020 10:19)  T(F): 98.4 (29 Mar 2020 10:19), Max: 98.4 (29 Mar 2020 10:19)  HR: 96 (29 Mar 2020 10:19) (83 - 96)  BP: 106/61 (29 Mar 2020 10:19) (106/61 - 111/60)P1   BP(mean): --  RR: 17 (29 Mar 2020 10:19) (17 - 18)  SpO2: 100% (29 Mar 2020 10:19) (99% - 100%)    Gen:  pt is resting comfortably in NAD, mild pallor  Pul:  clear B/L  Cor:  S1S2 RRR  Abd;  soft, no rebound, mildly tender to deep palpation at lower quadrants, L>R, focally 2cm left of umbilicus, no palpable mass.           mild RUQ tenderness- generalized.  No Elena's sign.  No psoas sign.  Pelvic:  deferred  Extr:  NT, no edema                          8.6    7.57  )-----------( 321      ( 29 Mar 2020 07:05 )             25.8   CT pelvis:  showed 3.9 cm right adnexal mass and 2cm right adnexal cystic structure (increased from 1.4cm from days prior)    Pelvic sonogram reviewed with technicians- left adnexal cysts of 3cm and 1.6cm-both simple.  No free fluid, no suspicious characteristics, Good doppler flow to adnexa. 35 yo P1 LMP 3/13/2020 presented to  with persistence of rectal bleeding after prior evaluation at hospital on 3/23/2020.  Pt reports having dark red blood while stooling with regular bowel movements since seven days ago which then became looser movements.  She denies any tarry stool.  She has no history of rectal bleeding prior and no GI issues in general.  She denies any changes in her diet/lifestyle/medications.  She reports having regular menses every 29 days.  She denies any active bleeding presently per rectum.      GynHx:  none  OBHx:      PMH:  none   PSH:  2012  Primary - for arrest of descent, at term gestation, uncomplicated.  [facility]      PE:  Vital Signs Last 24 Hrs  T(C): 36.9 (29 Mar 2020 10:19), Max: 36.9 (29 Mar 2020 10:19)  T(F): 98.4 (29 Mar 2020 10:19), Max: 98.4 (29 Mar 2020 10:19)  HR: 96 (29 Mar 2020 10:19) (83 - 96)  BP: 106/61 (29 Mar 2020 10:19) (106/61 - 111/60)P1   BP(mean): --  RR: 17 (29 Mar 2020 10:19) (17 - 18)  SpO2: 100% (29 Mar 2020 10:19) (99% - 100%)    Gen:  pt is resting comfortably in NAD, mild pallor  Pul:  clear B/L  Cor:  S1S2 RRR  Abd;  soft, no rebound, mildly tender to deep palpation at lower quadrants, L>R, focally 2cm left of umbilicus, no palpable mass.           mild RUQ tenderness- generalized.  No Elena's sign.  No psoas sign.  Pelvic:  deferred  Extr:  NT, no edema                          8.6    7.57  )-----------( 321      ( 29 Mar 2020 07:05 )             25.8   CT pelvis:  showed 3.9 cm right adnexal mass and 2cm right adnexal cystic structure (increased from 1.4cm from days prior)    Pelvic sonogram reviewed with technicians- right ovarian cysts of 3cm and 1.6cm-both simple.  No free fluid, no suspicious characteristics, Good doppler flow to adnexa.

## 2020-03-30 ENCOUNTER — TRANSCRIPTION ENCOUNTER (OUTPATIENT)
Age: 35
End: 2020-03-30

## 2020-03-30 VITALS
HEART RATE: 86 BPM | SYSTOLIC BLOOD PRESSURE: 120 MMHG | TEMPERATURE: 98 F | RESPIRATION RATE: 14 BRPM | DIASTOLIC BLOOD PRESSURE: 68 MMHG | OXYGEN SATURATION: 96 %

## 2020-03-30 LAB
ANION GAP SERPL CALC-SCNC: 5 MMOL/L — SIGNIFICANT CHANGE UP (ref 5–17)
BASOPHILS # BLD AUTO: 0.02 K/UL — SIGNIFICANT CHANGE UP (ref 0–0.2)
BASOPHILS NFR BLD AUTO: 0.3 % — SIGNIFICANT CHANGE UP (ref 0–2)
BUN SERPL-MCNC: 11 MG/DL — SIGNIFICANT CHANGE UP (ref 7–23)
CALCIUM SERPL-MCNC: 8.6 MG/DL — SIGNIFICANT CHANGE UP (ref 8.5–10.1)
CHLORIDE SERPL-SCNC: 109 MMOL/L — HIGH (ref 96–108)
CO2 SERPL-SCNC: 29 MMOL/L — SIGNIFICANT CHANGE UP (ref 22–31)
CREAT SERPL-MCNC: 0.73 MG/DL — SIGNIFICANT CHANGE UP (ref 0.5–1.3)
EOSINOPHIL # BLD AUTO: 0.05 K/UL — SIGNIFICANT CHANGE UP (ref 0–0.5)
EOSINOPHIL NFR BLD AUTO: 0.7 % — SIGNIFICANT CHANGE UP (ref 0–6)
GLUCOSE SERPL-MCNC: 89 MG/DL — SIGNIFICANT CHANGE UP (ref 70–99)
HCT VFR BLD CALC: 25.6 % — LOW (ref 34.5–45)
HGB BLD-MCNC: 8.7 G/DL — LOW (ref 11.5–15.5)
IMM GRANULOCYTES NFR BLD AUTO: 0.4 % — SIGNIFICANT CHANGE UP (ref 0–1.5)
LYMPHOCYTES # BLD AUTO: 1.84 K/UL — SIGNIFICANT CHANGE UP (ref 1–3.3)
LYMPHOCYTES # BLD AUTO: 25.3 % — SIGNIFICANT CHANGE UP (ref 13–44)
MCHC RBC-ENTMCNC: 30.3 PG — SIGNIFICANT CHANGE UP (ref 27–34)
MCHC RBC-ENTMCNC: 34 GM/DL — SIGNIFICANT CHANGE UP (ref 32–36)
MCV RBC AUTO: 89.2 FL — SIGNIFICANT CHANGE UP (ref 80–100)
MONOCYTES # BLD AUTO: 0.39 K/UL — SIGNIFICANT CHANGE UP (ref 0–0.9)
MONOCYTES NFR BLD AUTO: 5.4 % — SIGNIFICANT CHANGE UP (ref 2–14)
NEUTROPHILS # BLD AUTO: 4.95 K/UL — SIGNIFICANT CHANGE UP (ref 1.8–7.4)
NEUTROPHILS NFR BLD AUTO: 67.9 % — SIGNIFICANT CHANGE UP (ref 43–77)
PLATELET # BLD AUTO: 347 K/UL — SIGNIFICANT CHANGE UP (ref 150–400)
POTASSIUM SERPL-MCNC: 3.9 MMOL/L — SIGNIFICANT CHANGE UP (ref 3.5–5.3)
POTASSIUM SERPL-SCNC: 3.9 MMOL/L — SIGNIFICANT CHANGE UP (ref 3.5–5.3)
RBC # BLD: 2.87 M/UL — LOW (ref 3.8–5.2)
RBC # FLD: 13.8 % — SIGNIFICANT CHANGE UP (ref 10.3–14.5)
SODIUM SERPL-SCNC: 143 MMOL/L — SIGNIFICANT CHANGE UP (ref 135–145)
WBC # BLD: 7.28 K/UL — SIGNIFICANT CHANGE UP (ref 3.8–10.5)
WBC # FLD AUTO: 7.28 K/UL — SIGNIFICANT CHANGE UP (ref 3.8–10.5)

## 2020-03-30 PROCEDURE — 99239 HOSP IP/OBS DSCHRG MGMT >30: CPT

## 2020-03-30 RX ORDER — ACETAMINOPHEN 500 MG
2 TABLET ORAL
Qty: 0 | Refills: 0 | DISCHARGE
Start: 2020-03-30

## 2020-03-30 RX ADMIN — PANTOPRAZOLE SODIUM 40 MILLIGRAM(S): 20 TABLET, DELAYED RELEASE ORAL at 09:26

## 2020-03-30 NOTE — DISCHARGE NOTE PROVIDER - CARE PROVIDER_API CALL
Ganga Navarrete)  Gastroenterology; Internal Medicine  [address], [address]  [place] [address]  Phone: [phone]  Fax: [phone]  Follow Up Time:

## 2020-03-30 NOTE — DISCHARGE NOTE PROVIDER - NSDCPNSUBOBJ_GEN_ALL_CORE
SUBJECTIVE:        CHIEF COMPLAINT:    Patient is a 34y old  Female who presents with a chief complaint of Anemia, Rectal Bleeding (30 Mar 2020 13:20)            HPI:    35 y/o F w/ no known PMHx recently admitted to  from 3/23 -3/26/2020 for evaluation and management of rectal bleeding was referred from [facility] to  (3/27) for evaluation of recurrent rectal bleeding. The patient reportedly c/o associated LLQ abdominal cramping. Labs => Hgb/Hct 10.0/29.7 -> 8.4/24.9. CT ABD/Pelvis => 1.  No CT evidence for bowel obstruction or inflammation. Normal appendix. This patient with rectal bleeding, further clinically indicated evaluation such as colonoscopy should not be deferred on the basis of the results of this exam. 2.  There is a 3.9 cm right adnexal cyst (image 102, series 2) similar to the prior study.  There is an additional 2.0 cm right adnexal cystic structure (image 99, series 2), which is larger compared to the prior exam, previously 1.4 cm Recommend sonographic correlation. In the ED the patient was given NS x 1L x 1. (27 Mar 2020 17:45)            Interval HPI and Overnight Events:        REVIEW OF SYSTEMS:    CONSTITUTIONAL: No weakness, fevers or chills    EYES/ENT: No visual changes;  No vertigo or throat pain     NECK: No pain or stiffness    RESPIRATORY: No cough, wheezing, hemoptysis; No shortness of breath    CARDIOVASCULAR: No chest pain or palpitations    GASTROINTESTINAL: No abdominal or epigastric pain. No nausea, vomiting, or hematemesis; No diarrhea or constipation. No melena or hematochezia.    GENITOURINARY: No dysuria, frequency or hematuria    NEUROLOGICAL: No numbness or weakness    SKIN: No itching, burning, rashes, or lesions     All other review of systems is negative unless indicated above        OBJECTIVE        PHYSICAL EXAM:    Vital Signs Last 24 Hrs    T(C): 36.7 (30 Mar 2020 13:39), Max: 36.7 (30 Mar 2020 05:43)    T(F): 98.1 (30 Mar 2020 13:39), Max: 98.1 (30 Mar 2020 05:43)    HR: 87 (30 Mar 2020 13:39) (79 - 101)    BP: 121/74 (30 Mar 2020 13:39) (90/49 - 121/74)    BP(mean): --    RR: 16 (30 Mar 2020 13:39) (14 - 21)    SpO2: 99% (30 Mar 2020 13:39) (99% - 100%)    Constitutional: NAD, awake and alert, well-developed    HEENT: PERR, EOMI, Normal Hearing, MMM    Neck: Soft and supple, No LAD, No JVD    Respiratory: Breath sounds are clear bilaterally, No wheezing, rales or rhonchi    Cardiovascular: S1 and S2, regular rate and rhythm, no Murmurs, gallops or rubs    Gastrointestinal: Bowel Sounds present, soft, nontender, nondistended, no guarding, no rebound    Extremities: No peripheral edema    Vascular: 2+ peripheral pulses    Neurological: A/O x 3, no focal deficits    Musculoskeletal: 5/5 strength b/l upper and lower extremities    Skin: No rashes        MEDICATIONS  (STANDING):            LABS:                             8.7      7.28  )-----------( 347      ( 30 Mar 2020 06:59 )               25.6         03-30        143  |  109<H>  |  11    ----------------------------<  89    3.9   |  29  |  0.73        Ca    8.6      30 Mar 2020 06:59        Specimen Source .Blood Blood-Peripheral   03-27 @ 13:22    Culture Results    No growth to date.                PRIMARY DIAGNOSIS    Abdominal pain, unclear etiology associated with lower GI bleeding    - colonoscopy done, normal     -case discussed with OB/GYN consult    -sonogram of pelvis without critical findings    -differential may possibly include a etiology of Mekel's diverticulum or alternative colonic cause    -schedule out patient mekels scan        SECONDARY DIAGNOSIS    Acute blood loss anemia secondary to rectal bleeding        Obese

## 2020-03-30 NOTE — DISCHARGE NOTE NURSING/CASE MANAGEMENT/SOCIAL WORK - PATIENT PORTAL LINK FT
You can access the FollowMyHealth Patient Portal offered by [facility] by registering at the following website: [email or web address]. By joining [facility]’s FollowMyHealth portal, you will also be able to view your health information using other applications (apps) compatible with our system.

## 2020-03-30 NOTE — DISCHARGE NOTE PROVIDER - NSDCFUADDAPPT_GEN_ALL_CORE_FT
Follow  up with Dr. Navarrete for jade's scan  Follow up at the [facility]  Call for appointments

## 2020-03-30 NOTE — DISCHARGE NOTE NURSING/CASE MANAGEMENT/SOCIAL WORK - NSDCFUADDAPPT_GEN_ALL_CORE_FT
Follow  up with Dr. Navarrete for jade's scan  Follow up at the [facility]  Call for appointments

## 2020-03-30 NOTE — PROGRESS NOTE ADULT - SUBJECTIVE AND OBJECTIVE BOX
EGD showed mild gastritis  colonoscopy negative  no blood noted    Recommend d/c home asap  outpatient Meckel's scan and consider outpt capsule endoscopy  d/c protonix  reg diet

## 2020-03-30 NOTE — DISCHARGE NOTE PROVIDER - NSDCMRMEDTOKEN_GEN_ALL_CORE_FT
acetaminophen 325 mg oral tablet: 2 tab(s) orally every 6 hours, As needed, Mild Pain (1 - 3), Moderate Pain (4 - 6)  acetaminophen 500 mg oral tablet: 2 tab(s) orally every 6 hours, As needed, Moderate Pain (4 - 6)

## 2020-03-30 NOTE — DISCHARGE NOTE PROVIDER - HOSPITAL COURSE
Pt admitted for abdominal pain and GI bleed. Seen by Dr Navarrete. Underwent colonoscopy which was negative.     Pt had improvement in pain and cleared for discharge by GI with out patient follow up and meckles scan. Pt eager for discharge.    CT as out patient done. Cleared by gyn.

## 2020-03-31 LAB
CULTURE RESULTS: SIGNIFICANT CHANGE UP
SPECIMEN SOURCE: SIGNIFICANT CHANGE UP

## 2020-04-01 DIAGNOSIS — N83.202 UNSPECIFIED OVARIAN CYST, LEFT SIDE: ICD-10-CM

## 2020-04-01 DIAGNOSIS — D62 ACUTE POSTHEMORRHAGIC ANEMIA: ICD-10-CM

## 2020-04-01 DIAGNOSIS — K29.71 GASTRITIS, UNSPECIFIED, WITH BLEEDING: ICD-10-CM

## 2020-04-01 DIAGNOSIS — N83.201 UNSPECIFIED OVARIAN CYST, RIGHT SIDE: ICD-10-CM

## 2021-05-26 NOTE — ED ADULT NURSE NOTE - NSFALLRSKASSESSDT_ED_ALL_ED
23-Mar-2020 22:29 Fluconazole Pregnancy And Lactation Text: This medication is Pregnancy Category C and it isn't know if it is safe during pregnancy. It is also excreted in breast milk.

## 2021-06-03 ENCOUNTER — APPOINTMENT (OUTPATIENT)
Dept: COLORECTAL SURGERY | Facility: CLINIC | Age: 36
End: 2021-06-03
Payer: SELF-PAY

## 2021-06-03 VITALS
OXYGEN SATURATION: 100 % | BODY MASS INDEX: 29.89 KG/M2 | HEIGHT: 66 IN | WEIGHT: 186 LBS | HEART RATE: 84 BPM | SYSTOLIC BLOOD PRESSURE: 125 MMHG | DIASTOLIC BLOOD PRESSURE: 85 MMHG

## 2021-06-03 DIAGNOSIS — K62.5 HEMORRHAGE OF ANUS AND RECTUM: ICD-10-CM

## 2021-06-03 DIAGNOSIS — K64.8 OTHER HEMORRHOIDS: ICD-10-CM

## 2021-06-03 PROCEDURE — 99214 OFFICE O/P EST MOD 30 MIN: CPT | Mod: 25

## 2021-06-03 PROCEDURE — 46600 DIAGNOSTIC ANOSCOPY SPX: CPT

## 2021-06-03 NOTE — CONSULT LETTER
[Dear  ___] : Dear  [unfilled], [Consult Letter:] : I had the pleasure of evaluating your patient, [unfilled]. [Please see my note below.] : Please see my note below. [Consult Closing:] : Thank you very much for allowing me to participate in the care of this patient.  If you have any questions, please do not hesitate to contact me. [Sincerely,] : Sincerely, [FreeTextEntry3] : Willow Cheney MD\par

## 2021-06-03 NOTE — ASSESSMENT
[FreeTextEntry1] : Ms. Johnathan Neumann presents to the office with sporadic and intermittent episodes of rectal bleeding that have been occurring on a yearly basis.  There is no associated constipation with these episodes.  She is up-to-date with colonoscopy with the most recent having been completed 1 year prior.  Visual inspection, TAD and anoscopy in office today at bedside was otherwise unremarkable.  She had grade 2 internal hemorrhoids that were mildly inflamed.  She was otherwise reassured that no bedside office procedures such as rubber band ligation or surgical hemorrhoidectomy was warranted at this junction.  She was also cautioned however, to monitor for worsening rectal bleeding in terms of frequency and amount.  Should this occur, patient should return to the office for further evaluation.  She understands and is agreeable with plan of care.

## 2021-06-03 NOTE — PHYSICAL EXAM
[Normal rectal exam] : exam was normal [Reduce Spontaneously] : a spontaneously reducible (grade II) [Skin Tags] : there were no residual hemorrhoidal skin tags seen [Normal] : was normal [None] : there was no rectal mass  [Gross Blood] : no gross blood [No Rash or Lesion] : No rash or lesion [Alert] : alert [Oriented to Person] : oriented to person [Oriented to Place] : oriented to place [Oriented to Time] : oriented to time [Calm] : calm [de-identified] : No apparent distress [de-identified] : Normocephalic atraumatic [de-identified] : Moving all extremities x4

## 2021-06-03 NOTE — HISTORY OF PRESENT ILLNESS
[FreeTextEntry1] : Trista presents to the office for consultation regarding rectal bleeding.  She states that 1 year ago, she had an episode of bloody diarrhea.  She was seen in the hospital and underwent an EGD and colonoscopy.  The EGD demonstrated gastritis and the colonoscopy revealed only internal hemorrhoids.  Since that time she has had an additional episode of rectal bleeding in April 2021.  It was sudden onset and resolved readily.  She denies any constipation and in fact passes loose stools at least 2-4 times daily.  There are no complaints with hemorrhoidal burning/itching or pain.

## 2021-08-26 ENCOUNTER — APPOINTMENT (OUTPATIENT)
Dept: OPHTHALMOLOGY | Facility: CLINIC | Age: 36
End: 2021-08-26

## 2021-09-03 NOTE — ED ADULT NURSE NOTE - TEMPLATE
We will call you with schedule for mammogram and bone density scan.     We put in an order for a heart monitor  
General

## 2021-09-22 ENCOUNTER — OUTPATIENT (OUTPATIENT)
Dept: OUTPATIENT SERVICES | Facility: HOSPITAL | Age: 36
LOS: 1 days | End: 2021-09-22
Payer: COMMERCIAL

## 2021-09-22 ENCOUNTER — APPOINTMENT (OUTPATIENT)
Dept: ULTRASOUND IMAGING | Facility: CLINIC | Age: 36
End: 2021-09-22
Payer: COMMERCIAL

## 2021-09-22 DIAGNOSIS — K29.70 GASTRITIS, UNSPECIFIED, WITHOUT BLEEDING: ICD-10-CM

## 2021-09-22 DIAGNOSIS — Z00.01 ENCOUNTER FOR GENERAL ADULT MEDICAL EXAMINATION WITH ABNORMAL FINDINGS: ICD-10-CM

## 2021-09-22 PROCEDURE — 76700 US EXAM ABDOM COMPLETE: CPT

## 2021-09-22 PROCEDURE — 76700 US EXAM ABDOM COMPLETE: CPT | Mod: 26

## 2022-03-11 NOTE — ED ADULT NURSE NOTE - NS ED NOTE  TALK SOMEONE YN
74 y/o female with PMH of HTN, HLD, GERD, Glaucoma, osteoporosis, scoliosis present to ED complaining of  right hip/flank pain. Patient states pain started 4 days ago, radiating into abdomen and down right thigh. Pain worsened in the last 3 days to now 10 out 10. States she took Motrin and Tizanidine for a while without relief., then took Tylenol every 4 hours around the clock with minimal improvement. Today, pain was 10 out 10, constant. In ED, patient was given Oxycodone, Toradol, Lidocaine patch with some relief to 6 out of 10. Patient also states she experienced dysuria for one day. Patient denies fever/chills, fatigue, chest pain, difficulty breathing, change in bowel movements, change in urination, lower extremity edema, appetite changes, rashes.        no

## 2022-06-27 NOTE — ED ADULT NURSE NOTE - MUSCULOSKELETAL ASSESSMENT
Patient: Emily De Luna Date: 2022   : 1943    69year old female      [address] NOTE    Consulting Provider:  Marley Mehta DPM      SUBJECTIVE:    Chief Complaint   Patient presents with   â¢ Wound     L lateral ankle       Wound/Ulcer Present:      Wound Ankle Left Lateral Traumatic (Active)   Date First Assessed/Time First Assessed: 22 1032   Location: Ankle  Laterality: Left  Modifier: Lateral  Level of Skin Injury: Full Thickness  Primary Wound Type: Traumatic  Wound Approximate Age at First Assessment (Weeks): 156 weeks      No assessment data to display              Maximum Baseline Ambulatory Status:  Walks with cane    History of Present Illness: This is a 69year old female who presents for evaluation of left lateral ankle wound. Patient states she has had it a little over 3 years and recently saw a primary care doctor who suggested she see the wound center. She has been utilizing Neosporin on and off and keeping it covered with a simple Band-Aid. Patient states she does occasionally sleep on her left side leaning on that bone which does give her quite a bit of problem so lately she has been trying to sleep with her foot out of the bed sheets dangling without any pressure on it. Review of Systems:  Pertinent items are noted in HPI (history of present illness).     Past Medical History:   Diagnosis Date   â¢ Anemia    â¢ Anxiety    â¢ Avulsion fracture of ankle    â¢ Basilar artery aneurysm (CMS/HCC)    â¢ Bronchiectasis (CMS/HCC)    â¢ Chronic obstructive lung disease (CMS/HCC)    â¢ Gait disturbance    â¢ Glaucoma    â¢ Heartburn    â¢ Hepatosplenomegaly    â¢ History of electroconvulsive therapy    â¢ History of MAC infection    â¢ Hypercalcemia    â¢ Macrocytosis    â¢ Myasthenia gravis (CMS/HCC)    â¢ Osteopenia    â¢ Osteoporosis     2019 diagnosed   â¢ Radiculopathy, lumbar region     right   â¢ Recurrent major depressive disorder (CMS/HCC)    â¢ Uterine fibroid â¢ Vitamin D deficiency      Past Surgical History:   Procedure Laterality Date   â¢ Breast biopsy     â¢ [place] tooth extraction       Social History     Socioeconomic History   â¢ Marital status:      Spouse name: Not on file   â¢ Number of children: Not on file   â¢ Years of education: Not on file   â¢ Highest education level: Not on file   Occupational History   â¢ Not on file   Tobacco Use   â¢ Smoking status: Never Smoker   â¢ Smokeless tobacco: Never Used   Vaping Use   â¢ Vaping Use: never used   Substance and Sexual Activity   â¢ Alcohol use: Never   â¢ Drug use: Never   â¢ Sexual activity: Not on file   Other Topics Concern   â¢ Not on file   Social History Narrative    Came to [place] from [place] about 5 years ago - moved to be closer to sister. No children.  in remote past.    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 Social Determinants of Health     Financial Resource Strain: Not on file   Food Insecurity: Not on file   Transportation Needs: Not on file   Physical Activity: Not on file   Stress: Not on file   Social Connections: Not on file   Intimate Partner Violence: Not At Risk   â¢ Social Determinants: Intimate Partner Violence Past Fear: No   â¢ Social Determinants: Intimate Partner Violence Current Fear: No     Family History   Problem Relation Age of Onset   â¢ Other Mother         chronic bronchitis, sepsis syndrome   â¢ Dementia/Alzheimers Mother    â¢ Hypertension Mother    â¢ Stroke/TIA Father    â¢ Hypertension Father    â¢ Hereditary non-polyposis colon cancer Maternal Cousin        Current Outpatient Medications   Medication Sig   â¢ brimonidine (ALPHAGAN) 0.2 % ophthalmic solution    â¢ dorzolamide (TRUSOPT) 2 % ophthalmic solution    â¢ ipratropium (ATROVENT) 0.03 % nasal spray Spray 2 sprays in each nostril 2 times daily as needed (post nasal drip).    â¢ losartan (COZAAR) 50 "MG tablet Take one tablet by mouth once daily for blood pressure   â¢ pyridostigmine (MESTINON) 60 MG tablet Take 1 1/2 tablets three times a day. 90mg at 0730 noon 1700   â¢ carvedilol (COREG) 12.5 MG tablet Take 1 tablet by mouth 2 times daily (with meals). â¢ atorvastatin (LIPITOR) 10 MG tablet Take one tablet by mouth once daily for stroke prevention   â¢ alendronate (FOSAMAX) 70 MG tablet Take one tablet by mouth every 7 days on empty stomach with water only. â¢ aspirin 81 MG chewable tablet Chew 1 tablet by mouth daily. â¢ fluticasone propionate (FLOVENT HFA) 110 MCG/ACT inhaler Inhale 2 puffs into the lungs 2 times daily. â¢ ipratropium-albuterol (DUONEB) 0.5-2.5 (3) MG/3ML nebulizer solution Take 3 mLs by nebulization 2 times daily. â¢ Chondroitin Sulfate-Vit C-Mn 400-60-2.5 MG Cap Take 1 capsule by mouth daily. â¢ Melatonin 1 MG Cap Take 1.5 mg by mouth at bedtime. â¢ Multiple Vitamin tablet Take 1 tablet by mouth daily. â¢ nortriptyline (PAMELOR) 25 MG capsule Take 25 mg by mouth nightly. â¢ risperiDONE (RISPERDAL) 0.25 MG tablet Take 0.125 mg by mouth at bedtime. No current facility-administered medications for this encounter.         ALLERGIES:  Ethambutol, Mirtazapine, Rifampin, Serotonin reuptake inhibitors, Zyprexa, Ciprofloxacin, and Amoxicillin-pot clavulanate    OBJECTIVE:  Vital Signs:    Visit Vitals  BP (!) 161/65 (BP Location: RUE - Right upper extremity, Patient Position: Sitting)   Pulse 70   Temp 96.3 Â°F (35.7 Â°C) (Temporal)   Resp 20   Ht 4' 11"" (1.499 m)   Wt 44.5 kg (98 lb)   BMI 19.79 kg/mÂ²         Physical Exam:  General appearance: oriented to person, place, time and situation  Physical Exam        PROCEDURE:  None performed    Procedure was Performed by:  Silver Timmons DPM    Laboratory assessments reviewed:  No results found for: PAB   Albumin (g/dL)   Date Value   03/08/2022 3.5 (L)   12/13/2021 3.6   12/01/2021 3.1 (L)      No results available in last 24 hours    Lab " Results   Component Value Date    WBC 7.0 03/08/2022    GLUCOSE 91 03/08/2022    CREATININE 0.66 03/08/2022    GFRA >90 11/16/2020    GFRNA 89 11/16/2020        Culture results:  No results found for: SDES No results found for: CULT     Diagnostic Assessments Reviewed:  No new update            Wound treatment goals are palliative:  No    DIAGNOSES:  Problem List Items Addressed This Visit    None          Medical Decision Making:     Today we examined the patient's condition. Patient does have good neurovascular status. She states she does have Raynaud's or has been diagnosed with it in the past but she denies any recent problems with it. At this time were simply can offload the wound and try a bandaging technique to take as much pressure off it is possible. No debridement is performed today since the wound is so superficial very small in nature. The drainage is very minimal no signs of infection are noted. We will follow-up in 2 weeks time. Local Wound Care  No therapy plan of the specified type found. Order Comments:  None     Risk with Open Wound    As discussed, with open skin there is always a risk of infection. Infection may lead to need of further surgical debridement or possible amputation. Infection can possibly lead sepsis and subsequently death or permanent disability. Offloading    Offloading was reviewed and discussed with patient today. Diabetic Management  -Patinet is not a DM by History    Edema  Negative edema noted    Infectious Disease  No signs of infection    Imaging & Vascular  No new imaging or vascular studies    Labs  No labs at this time    Consults  No necessary consults    Pain  Pain is minimal and only noted with touching or bumping the wound. Follow Up  No follow-ups on file. Patient to follow up as scheduled for continued management and recommendations. All questions solicited and answered. Patient stable. All questions were answered. - - -

## 2023-04-27 NOTE — ED STATDOCS - CHPI ED SYMPTOM NEG
no vomiting What Type Of Note Output Would You Prefer (Optional)?: Standard Output Hpi Title: Evaluation of Skin Lesions How Severe Are Your Spot(S)?: moderate Have Your Spot(S) Been Treated In The Past?: has not been treated

## 2023-11-22 NOTE — ED STATDOCS - DISCHARGE DATE
Your urine does show evidence of infection and this may be causing your urinary tract symptoms. Take antibiotic as instructed. Plan follow-up with your primary care in the next 2 to 3 weeks for recheck of your urine. You may want to call their office sooner rather than later to schedule an appointment. If completely unable to void, unusual weakness, abdominal pain proceed to emergency room for further evaluation. 12-Dec-2018

## 2024-02-06 NOTE — ED PROVIDER NOTE - CADM POA PRESS ULCER
Additional Notes: Discussed cosmetic extraction No Detail Level: Simple Render Risk Assessment In Note?: no

## 2024-03-15 NOTE — ED ADULT NURSE NOTE - NS ED NURSE LEVEL OF CONSCIOUSNESS ORIENTATION
General Surgery consult received for Crohn's exacerbation. GI also consulted for same. Case reviewed by Dr Travis. Will defer Management of Crohn's exacerbation to GI for nonsurgical management. Please re-consult if GI unable to manage. Thank you for the consult.     Kimberly Frommel, JESSICAP-BC  
Negative.    HENT: Negative.     Eyes: Negative.    Respiratory: Negative.     Cardiovascular: Negative.    Gastrointestinal:  Positive for abdominal pain, diarrhea, nausea and vomiting.   Endocrine: Negative.    Genitourinary: Negative.    Musculoskeletal: Negative.    Skin: Negative.    Allergic/Immunologic: Negative.    Neurological: Negative.    Hematological: Negative.    Psychiatric/Behavioral: Negative.          Recommendations-      No steroids or Abx indicated at this time. Discontinued, continue to monitor off steroids and Abx, trend white count.   Recommend starting a Golytely 4L prep as tolerated (slowly), then Miralax 1 scoop twice a day moving forward indefinitely, Movantik 25mg daily for suspected narcotic bowel can be considered as well.   High fiber diet and Benefiber daily  Drink plenty of water daily  Dulcolax suppository 10mg daily   Colace 100mg PO BID daily   Outpatient follow up to discuss any constipation issues and also plan for a f/u colonoscopy and potentially CT enterography to assess status of Crohns  LIMIT NARCOTIC USE -  Check CRP, ESR, and fecal lactoferrin as baseline  Suspect elevated white count if more infectious in nature  Septic workup if any fevers     No further recommendations at this time.     Thank you for involving the GI service in the care of your patient. Please dont hesitate to call me directly on my cell below with any questions, updates, etc.      -Marlon Rodriguez MD  [phone]   Gastroenterology/Hepatology    
Oriented - self; Oriented - place; Oriented - time

## 2025-06-10 NOTE — ED ADULT NURSE NOTE - NS ED NOTE  TALK SOMEONE YN
Occurrences:   1     Expected Date:   6/10/2025     Expiration Date:   6/10/2026     Reason for exam::   left ankle pain from fall    XR FOOT LEFT (MIN 3 VIEWS)     Standing Status:   Future     Expected Date:   6/10/2025     Expiration Date:   6/10/2026     Reason for exam::   left foot pain, injury    [employer] Walker Boot, Air Select Low Top, Left (); S (M4-7/F4-6)     Equipment::   Walker Boot, Air Select Low Top, Left ()     Size:   S (M4-7/F4-6)     No orders of the defined types were placed in this encounter.    There are no discontinued medications.  Return for worsening of condition, if symptoms do not improve in 3-5 days.        Reviewed with the patient/family: current clinical status & medications.  Side effects of the medication prescribed today, as well as treatment plan/rationale and result expectations have been discussed with the patient/family who expresses understanding. Patient will be discharged home in stable condition.    Follow up with PCP to evaluate treatment results or return if symptoms worsen or fail to improve. Discussed signs and symptoms which require immediate follow-up in ED/call to 911.  Understanding verbalized.     I have reviewed the patient's medical history in detail and updated the computerized patient record.    MARIS COREA - CNP  The patient (or guardian, if applicable) and other individuals in attendance with the patient were advised that Artificial Intelligence will be utilized during this visit to record, process the conversation to generate a clinical note, and support improvement of the AI technology. The patient (or guardian, if applicable) and other individuals in attendance at the appointment consented to the use of AI, including the recording.                            No

## 2025-06-23 NOTE — ED ADULT NURSE NOTE - NS ED NURSE DISCH DISPOSITION
Today's visit was performed with the assistance of  Services.   Name of : Dave Chen [id number]   Service used: Video: Third Party         Health Maintenance       COVID-19 Vaccine (1 - Pediatric 2024-25 season)  Never done           Following review of the above:  Patient is not proceeding with: COVID-19    Note: Refer to final orders and clinician documentation.        Admitted